# Patient Record
Sex: FEMALE | Race: WHITE | Employment: OTHER | URBAN - METROPOLITAN AREA
[De-identification: names, ages, dates, MRNs, and addresses within clinical notes are randomized per-mention and may not be internally consistent; named-entity substitution may affect disease eponyms.]

---

## 2017-04-28 ENCOUNTER — COMMUNICATION - HEALTHEAST (OUTPATIENT)
Dept: ENDOCRINOLOGY | Facility: CLINIC | Age: 82
End: 2017-04-28

## 2017-04-28 ENCOUNTER — AMBULATORY - HEALTHEAST (OUTPATIENT)
Dept: NURSING | Facility: CLINIC | Age: 82
End: 2017-04-28

## 2017-04-28 DIAGNOSIS — M89.9 DISORDER OF BONE AND CARTILAGE: ICD-10-CM

## 2017-04-28 DIAGNOSIS — M94.9 DISORDER OF BONE AND CARTILAGE: ICD-10-CM

## 2017-05-09 ENCOUNTER — RECORDS - HEALTHEAST (OUTPATIENT)
Dept: BONE DENSITY | Facility: CLINIC | Age: 82
End: 2017-05-09

## 2017-05-09 ENCOUNTER — COMMUNICATION - HEALTHEAST (OUTPATIENT)
Dept: TELEHEALTH | Facility: CLINIC | Age: 82
End: 2017-05-09

## 2017-05-09 DIAGNOSIS — M89.9 DISORDER OF BONE, UNSPECIFIED: ICD-10-CM

## 2017-05-09 DIAGNOSIS — M94.9 DISORDER OF CARTILAGE, UNSPECIFIED: ICD-10-CM

## 2017-05-10 ENCOUNTER — RECORDS - HEALTHEAST (OUTPATIENT)
Dept: ADMINISTRATIVE | Facility: OTHER | Age: 82
End: 2017-05-10

## 2017-05-11 ENCOUNTER — COMMUNICATION - HEALTHEAST (OUTPATIENT)
Dept: ENDOCRINOLOGY | Facility: CLINIC | Age: 82
End: 2017-05-11

## 2017-05-12 ENCOUNTER — AMBULATORY - HEALTHEAST (OUTPATIENT)
Dept: LAB | Facility: CLINIC | Age: 82
End: 2017-05-12

## 2017-05-12 DIAGNOSIS — M89.9 DISORDER OF BONE AND CARTILAGE: ICD-10-CM

## 2017-05-12 DIAGNOSIS — M94.9 DISORDER OF BONE AND CARTILAGE: ICD-10-CM

## 2017-05-19 ENCOUNTER — OFFICE VISIT - HEALTHEAST (OUTPATIENT)
Dept: ENDOCRINOLOGY | Facility: CLINIC | Age: 82
End: 2017-05-19

## 2017-05-19 DIAGNOSIS — M94.9 DISORDER OF BONE AND CARTILAGE: ICD-10-CM

## 2017-05-19 DIAGNOSIS — M89.9 DISORDER OF BONE AND CARTILAGE: ICD-10-CM

## 2017-08-07 ASSESSMENT — MIFFLIN-ST. JEOR: SCORE: 1030.09

## 2017-08-09 ENCOUNTER — ANESTHESIA - HEALTHEAST (OUTPATIENT)
Dept: SURGERY | Facility: CLINIC | Age: 82
End: 2017-08-09

## 2017-08-10 ENCOUNTER — SURGERY - HEALTHEAST (OUTPATIENT)
Dept: SURGERY | Facility: CLINIC | Age: 82
End: 2017-08-10

## 2017-09-01 ENCOUNTER — SURGERY - HEALTHEAST (OUTPATIENT)
Dept: SURGERY | Facility: CLINIC | Age: 82
End: 2017-09-01

## 2017-09-01 ENCOUNTER — ANESTHESIA - HEALTHEAST (OUTPATIENT)
Dept: SURGERY | Facility: CLINIC | Age: 82
End: 2017-09-01

## 2017-11-20 ENCOUNTER — AMBULATORY - HEALTHEAST (OUTPATIENT)
Dept: NURSING | Facility: CLINIC | Age: 82
End: 2017-11-20

## 2018-04-18 ENCOUNTER — RECORDS - HEALTHEAST (OUTPATIENT)
Dept: LAB | Facility: CLINIC | Age: 83
End: 2018-04-18

## 2018-04-18 LAB
ALBUMIN SERPL-MCNC: 3.6 G/DL (ref 3.5–5)
ALP SERPL-CCNC: 48 U/L (ref 45–120)
ALT SERPL W P-5'-P-CCNC: 15 U/L (ref 0–45)
ANION GAP SERPL CALCULATED.3IONS-SCNC: 8 MMOL/L (ref 5–18)
AST SERPL W P-5'-P-CCNC: 19 U/L (ref 0–40)
BILIRUB SERPL-MCNC: 0.6 MG/DL (ref 0–1)
BUN SERPL-MCNC: 30 MG/DL (ref 8–28)
CALCIUM SERPL-MCNC: 9.5 MG/DL (ref 8.5–10.5)
CHLORIDE BLD-SCNC: 105 MMOL/L (ref 98–107)
CHOLEST SERPL-MCNC: 168 MG/DL
CO2 SERPL-SCNC: 27 MMOL/L (ref 22–31)
CREAT SERPL-MCNC: 0.76 MG/DL (ref 0.6–1.1)
FASTING STATUS PATIENT QL REPORTED: NORMAL
GFR SERPL CREATININE-BSD FRML MDRD: >60 ML/MIN/1.73M2
GLUCOSE BLD-MCNC: 86 MG/DL (ref 70–125)
HDLC SERPL-MCNC: 68 MG/DL
LDLC SERPL CALC-MCNC: 81 MG/DL
POTASSIUM BLD-SCNC: 4.8 MMOL/L (ref 3.5–5)
PROT SERPL-MCNC: 6.5 G/DL (ref 6–8)
SODIUM SERPL-SCNC: 140 MMOL/L (ref 136–145)
TRIGL SERPL-MCNC: 96 MG/DL

## 2018-05-02 ENCOUNTER — AMBULATORY - HEALTHEAST (OUTPATIENT)
Dept: ENDOCRINOLOGY | Facility: CLINIC | Age: 83
End: 2018-05-02

## 2018-05-02 ENCOUNTER — COMMUNICATION - HEALTHEAST (OUTPATIENT)
Dept: ENDOCRINOLOGY | Facility: CLINIC | Age: 83
End: 2018-05-02

## 2018-05-02 DIAGNOSIS — M89.9 DISORDER OF BONE AND CARTILAGE: ICD-10-CM

## 2018-05-02 DIAGNOSIS — M81.0 OSTEOPOROSIS: ICD-10-CM

## 2018-05-02 DIAGNOSIS — M94.9 DISORDER OF BONE AND CARTILAGE: ICD-10-CM

## 2019-06-03 ENCOUNTER — RECORDS - HEALTHEAST (OUTPATIENT)
Dept: LAB | Facility: CLINIC | Age: 84
End: 2019-06-03

## 2019-06-03 LAB
ALBUMIN SERPL-MCNC: 3.7 G/DL (ref 3.5–5)
ALP SERPL-CCNC: 47 U/L (ref 45–120)
ALT SERPL W P-5'-P-CCNC: 17 U/L (ref 0–45)
ANION GAP SERPL CALCULATED.3IONS-SCNC: 10 MMOL/L (ref 5–18)
AST SERPL W P-5'-P-CCNC: 14 U/L (ref 0–40)
BILIRUB SERPL-MCNC: 0.4 MG/DL (ref 0–1)
BUN SERPL-MCNC: 32 MG/DL (ref 8–28)
CALCIUM SERPL-MCNC: 10.1 MG/DL (ref 8.5–10.5)
CHLORIDE BLD-SCNC: 103 MMOL/L (ref 98–107)
CHOLEST SERPL-MCNC: 248 MG/DL
CO2 SERPL-SCNC: 27 MMOL/L (ref 22–31)
CREAT SERPL-MCNC: 0.83 MG/DL (ref 0.6–1.1)
FASTING STATUS PATIENT QL REPORTED: ABNORMAL
GFR SERPL CREATININE-BSD FRML MDRD: >60 ML/MIN/1.73M2
GLUCOSE BLD-MCNC: 89 MG/DL (ref 70–125)
HDLC SERPL-MCNC: 64 MG/DL
LDLC SERPL CALC-MCNC: 160 MG/DL
POTASSIUM BLD-SCNC: 4.5 MMOL/L (ref 3.5–5)
PROT SERPL-MCNC: 6.5 G/DL (ref 6–8)
SODIUM SERPL-SCNC: 140 MMOL/L (ref 136–145)
TRIGL SERPL-MCNC: 120 MG/DL
TSH SERPL DL<=0.005 MIU/L-ACNC: 1.9 UIU/ML (ref 0.3–5)

## 2019-06-04 LAB — 25(OH)D3 SERPL-MCNC: 33.1 NG/ML (ref 30–80)

## 2021-01-07 ENCOUNTER — RECORDS - HEALTHEAST (OUTPATIENT)
Dept: LAB | Facility: CLINIC | Age: 86
End: 2021-01-07

## 2021-01-07 LAB
ALBUMIN SERPL-MCNC: 3.8 G/DL (ref 3.5–5)
ALP SERPL-CCNC: 101 U/L (ref 45–120)
ALT SERPL W P-5'-P-CCNC: 15 U/L (ref 0–45)
ANION GAP SERPL CALCULATED.3IONS-SCNC: 9 MMOL/L (ref 5–18)
AST SERPL W P-5'-P-CCNC: 16 U/L (ref 0–40)
BILIRUB SERPL-MCNC: 0.4 MG/DL (ref 0–1)
BUN SERPL-MCNC: 33 MG/DL (ref 8–28)
CALCIUM SERPL-MCNC: 9.5 MG/DL (ref 8.5–10.5)
CHLORIDE BLD-SCNC: 102 MMOL/L (ref 98–107)
CHOLEST SERPL-MCNC: 231 MG/DL
CO2 SERPL-SCNC: 30 MMOL/L (ref 22–31)
CREAT SERPL-MCNC: 0.93 MG/DL (ref 0.6–1.1)
FASTING STATUS PATIENT QL REPORTED: ABNORMAL
GFR SERPL CREATININE-BSD FRML MDRD: 57 ML/MIN/1.73M2
GLUCOSE BLD-MCNC: 106 MG/DL (ref 70–125)
HDLC SERPL-MCNC: 65 MG/DL
LDLC SERPL CALC-MCNC: 137 MG/DL
POTASSIUM BLD-SCNC: 4.5 MMOL/L (ref 3.5–5)
PROT SERPL-MCNC: 6.5 G/DL (ref 6–8)
SODIUM SERPL-SCNC: 141 MMOL/L (ref 136–145)
TRIGL SERPL-MCNC: 145 MG/DL

## 2021-05-13 ENCOUNTER — RECORDS - HEALTHEAST (OUTPATIENT)
Dept: LAB | Facility: CLINIC | Age: 86
End: 2021-05-13

## 2021-05-13 LAB
ANION GAP SERPL CALCULATED.3IONS-SCNC: 9 MMOL/L (ref 5–18)
BUN SERPL-MCNC: 28 MG/DL (ref 8–28)
CALCIUM SERPL-MCNC: 9.2 MG/DL (ref 8.5–10.5)
CHLORIDE BLD-SCNC: 104 MMOL/L (ref 98–107)
CO2 SERPL-SCNC: 28 MMOL/L (ref 22–31)
CREAT SERPL-MCNC: 0.8 MG/DL (ref 0.6–1.1)
GFR SERPL CREATININE-BSD FRML MDRD: >60 ML/MIN/1.73M2
GLUCOSE BLD-MCNC: 104 MG/DL (ref 70–125)
POTASSIUM BLD-SCNC: 4.2 MMOL/L (ref 3.5–5)
SODIUM SERPL-SCNC: 141 MMOL/L (ref 136–145)

## 2021-05-14 LAB — BACTERIA SPEC CULT: NO GROWTH

## 2021-05-28 ENCOUNTER — RECORDS - HEALTHEAST (OUTPATIENT)
Dept: ADMINISTRATIVE | Facility: CLINIC | Age: 86
End: 2021-05-28

## 2021-05-29 ENCOUNTER — RECORDS - HEALTHEAST (OUTPATIENT)
Dept: ADMINISTRATIVE | Facility: CLINIC | Age: 86
End: 2021-05-29

## 2021-05-30 ENCOUNTER — RECORDS - HEALTHEAST (OUTPATIENT)
Dept: ADMINISTRATIVE | Facility: CLINIC | Age: 86
End: 2021-05-30

## 2021-05-31 VITALS — WEIGHT: 135 LBS

## 2021-05-31 VITALS — BODY MASS INDEX: 21.83 KG/M2 | HEIGHT: 65 IN | WEIGHT: 131 LBS

## 2021-05-31 VITALS — WEIGHT: 130.5 LBS | BODY MASS INDEX: 21.72 KG/M2

## 2021-06-10 NOTE — PROGRESS NOTES
Chief Complaint   Patient presents with     Please Disregard      Pt had an appt with Deya PLATT today but she refused to see  she needs Prolia injection only, however, pt has not seen a provider since 2015. Last Dexa scan was 2014. Per protocol unable to give pt shot and pt needs to be seen. Pt would like to get in ASAP, so she'll schedule to see Deya PLATT since Dr. Pires's schedule is totally booked.     No Prolia given today.     Yue Melissa, [facility] 4/28/2017 12:37 PM

## 2021-06-10 NOTE — PROGRESS NOTES
[facility]  ENDOCRINOLOGY    Osteoporosis Follow Up 5/23/2017    Mariela Hagen, 6/7/1933, [id number]          Reason for visit      1. Osteopenia        History     Mariela Hagen is a very pleasant 83 y.o. old female who presents for follow up.   SUMMARY:  The patient had a bone density scan done on 09/11/2013 which showed T-Score of -1.9 on femoral neck on the right and -1.9 on L1 to L4 vertebra. Her FRAX score calculated was 21.3% for major osteoporotic fracture and 5.7% for the hip fracture. Patient reports being treated with Fosamax for at least 5 years. After that, she received 1 Prolia shot last year and a few months later she developed pain in  both TMJs after extensive and long dental work when she had her mouth open for at leat one hour. She was seen at [facility] for this problem and diagnosed with severe destruction of both temporomandibular joints. For this reason, patient stopped Prolia and did not receive any further treatment for osteopenia in the last year.      TODAY:  Mariela is here today in f/u for Osteoporosis.  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  She has been on Prolia injections since 2013.  She has been taking Calcium Carbonate with Vit D twice daily.  She also eats yoghurt, ice cream and drinks skim milk and Comfrey Milk.  She is active and walks daily.  Her most recent Dexa Scan shows that her bone health is stable enough at present to merit a drug Holiday.    Dexa Scan:    1. The spine bone density L1-L4 with T-score -1.1, stable compared to   2015.  2. Femoral bone densities show left femoral neck T- score -1.1 and right   femoral neck T-score -1.2, stable.  3. Trabecular bone score indicates poor trabecular bone architecture.          Risk Factors     The following high- risk conditions have been ruled out: celiac disease, eating disorders, gastric bypass,  hyperparathyroidism, inflammatory bowel disease, hyperthyroidism, rheumatoid arthritis, lupus, chronic kidney disease.    Mariela Hagen has the following risk factors: Age, Female gender, , Low BMI and Family history of osteoporosis    She is not on high risk medications such as glucocorticoids, anti-coagulants, anti-convulsants, chemotherapy or levothyroxine.    Patient deniesHysterectomy, Oophrectomy and Breast cancer.      Past Medical History     Patient Active Problem List   Diagnosis     Hyperlipidemia     TMJ Pain     Osteopenia       Family History       Osteoporosis in her mother. Breast cancer in her aunt, colon cancer in her brother. Negative for celiac disease, kidney stones. She has not used any high-risk  medications.      Social History      reports that she has never smoked. She does not have any smokeless tobacco history on file.      Review of Systems     Patient denies current pain, limited mobility, fractures.   Remainder per HPI.      Vital Signs     /76  Pulse 82  Wt 135 lb (61.2 kg)    Physical Exam     GENERAL:  Normal, NIRD  EYES:  Pupils equal, round and reactive to light; no proptosis, lid lag or  periorbital edema.  THYROID:  Thyroid is normal.  No tenderness or bruit  NECK: No lymph nodes  MUSCULOSKELETAL: No joint abnormalities, FROM in all four extremities. No kyphosis. Muscle strength grossly normal without evidence of wasting.  HEART:  Regular rate and rhythm without murmur.  LUNGS:  Clear to auscultation.  ABDOMEN:Soft, non-tender, no masses or organomegaly  NEURO:  Patella Reflexes were normal.No tremors  SKIN:  No acanthosis nigricans or vitiligo        Assessment     1. Osteopenia        Plan     Pt will be more than happy to go on a Drug Holiday, thank you very much!  Recommendation is to get another Dexa Scan next year, after a change in therapy.  She will continue with her diary intake, supplementation, and walking.  F/u in 1 year.      Total visit  minutes:25  Time spent counseling and coordination of care:23    Jailene Lowe   Endocrinology  5/23/2017  10:20 AM      Current Medications     Outpatient Medications Prior to Visit   Medication Sig Dispense Refill     acetaminophen (TYLENOL) 325 MG tablet        acyclovir (ZOVIRAX) 200 MG capsule        albuterol (PROAIR HFA) 90 mcg/actuation inhaler        almotriptan (AXERT) 12.5 MG tablet        calcium carbonate-vit D3-min (CALTRATE 600+D PLUS MINERALS) 600 mg calcium- 400 unit Tab        conjugated estrogens (PREMARIN) vaginal cream        fluticasone (FLONASE) 50 mcg/actuation nasal spray        glucosamine sulfate 2KCl (GLUCOSAMINE RELIEF) 1,000 mg Tab Take 3 tablets by mouth daily.       mometasone (ELOCON) 0.1 % cream        simvastatin (ZOCOR) 20 MG tablet        Facility-Administered Medications Prior to Visit   Medication Dose Route Frequency Provider Last Rate Last Dose     denosumab 60 mg (PROLIA 60 mg/ml)  60 mg Subcutaneous Q6 Months Rupali Pires MD   60 mg at 05/19/17 1541         Lab Results     TSH   Date Value Ref Range Status   10/16/2013 4.2 0.3 - 5.0 uIU/mL Final     PTH   Date Value Ref Range Status   10/16/2013 42 16 - 73 pg/mL Final     Calcium   Date Value Ref Range Status   05/12/2017 10.2 8.5 - 10.5 mg/dL Final     Phosphorus   Date Value Ref Range Status   10/16/2013 3.2 2.5 - 4.5 mg/dL Final           Imaging Results   Last DEXA scan:  Results for orders placed in visit on 05/09/17   DXA Bone Density Scan    Narrative 5/10/2017      RE: Mariela Hagen  YOB: 1933        Dear Jailene Lowe,    Patient Profile:  83 y.o. female, postmenopausal, is here for the follow up bone density   test.   History of fractures - None. Family history of osteoporosis - Yes;    sibling.  Family history of hip fracture: None. Smoking history - No.   Osteoporosis treatment past -  No. Osteoporosis treatment current - Yes;    Prolia.  Chronic medical problems - Chronic low back  "problems. High risk   medications -  None.      Assessment:    1. The spine bone density L1-L4 with T-score -1.1, stable compared to   2015.  2. Femoral bone densities show left femoral neck T- score -1.1 and right   femoral neck T-score -1.2, stable.  3. Trabecular bone score indicates poor trabecular bone architecture.      83 y.o. female with LOW BONE DENSITY (OSTEOPENIA), stable on the current   treatment.        Recommendations:  Appropriate calcium and vitamin D supplements and possible \"drug holiday\"   recommended with follow up bone density scan in 1 year.      Bone densitometry was performed on your patient using our Aionex   densitometer. The results are summarized and a copy of the actual scans   are included for your review. In conformity with the International Society   of Clinical Densitometry's most recent position statement for DXA   interpretation (2015), the diagnosis will be made on the lowest measured   T-score of the lumbar spine, femoral neck, total proximal femur or 33%   radius. Note the change in terminology for diagnostic classification from   OSTEOPENIA to LOW BONE MASS. All trending for sequential exams will be   done using multiple vertebrae or the total proximal femur. Fracture risk   is based on the WHO Fracture Risk Assessment Tool (FRAX). If additional   information is needed or if you would like to discuss the results, please   do not hesitate to call me.       Thank you for referring this patient to [facility].   We are happy to be of service in support of you and your practice. If you   have any questions or suggestions to improve our service, please call me   at [phone].     Sincerely,     Rupali Pires M.D. KRISCJACKELYN.  Osteoporosis Services, [facility]         "

## 2021-06-12 NOTE — ANESTHESIA CARE TRANSFER NOTE
Last vitals:   Vitals:    09/01/17 1233   BP: 141/67   Pulse: 60   Resp: 16   Temp: 36.8  C (98.3  F)   SpO2: 99%     Patient's level of consciousness is drowsy  Spontaneous respirations: yes  Maintains airway independently: yes  Dentition unchanged: yes  Oropharynx: oropharynx clear of all foreign objects    QCDR Measures:  ASA# 20 - Surgical Safety Checklist: WHO surgical safety checklist completed prior to induction  PQRS# 430 - Adult PONV Prevention: 4558F - Pt received => 2 anti-emetic agents (different classes) preop & intraop  ASA# 8 - Peds PONV Prevention: NA - Not pediatric patient, not GA or 2 or more risk factors NOT present  PQRS# 424 - Brenda-op Temp Management: 4559F - At least one body temp DOCUMENTED => 35.5C or 95.9F within required timeframe  PQRS# 426 - PACU Transfer Protocol: - Transfer of care checklist used  ASA# 14 - Acute Post-op Pain: ASA14B - Patient did NOT experience pain >= 7 out of 10

## 2021-06-12 NOTE — ANESTHESIA PREPROCEDURE EVALUATION
Anesthesia Evaluation      Patient summary reviewed   No history of anesthetic complications     Airway   Mallampati: I  Neck ROM: full   Pulmonary - negative ROS and normal exam                          Cardiovascular - negative ROS and normal exam   Neuro/Psych - negative ROS     Endo/Other - negative ROS      GI/Hepatic/Renal - negative ROS           Dental - normal exam                        Anesthesia Plan  Planned anesthetic: MAC    ASA 1     Anesthetic plan and risks discussed with: patient

## 2021-06-12 NOTE — ANESTHESIA CARE TRANSFER NOTE
Last vitals:   Vitals:    08/10/17 1025   BP: 146/67   Pulse: 60   Resp: 14   Temp: 36.7  C (98.1  F)   SpO2: 99%     Patient's level of consciousness is awake  Spontaneous respirations: yes  Maintains airway independently: yes  Dentition unchanged: yes  Oropharynx: oropharynx clear of all foreign objects    QCDR Measures:  ASA# 20 - Surgical Safety Checklist: WHO surgical safety checklist completed prior to induction  PQRS# 430 - Adult PONV Prevention: 4558F - Pt received => 2 anti-emetic agents (different classes) preop & intraop  ASA# 8 - Peds PONV Prevention: NA - Not pediatric patient, not GA or 2 or more risk factors NOT present  PQRS# 424 - Brenda-op Temp Management: 4559F - At least one body temp DOCUMENTED => 35.5C or 95.9F within required timeframe  PQRS# 426 - PACU Transfer Protocol: - Transfer of care checklist used  ASA# 14 - Acute Post-op Pain: ASA14B - Patient did NOT experience pain >= 7 out of 10   To PACU, VSS, On RA, Report to RN

## 2021-06-12 NOTE — ANESTHESIA POSTPROCEDURE EVALUATION
Patient: Mariela Hagen  #3 INTERSTIM STAGE 2  Anesthesia type: MAC    Patient location: Phase II Recovery  Last vitals:   Vitals:    09/01/17 1320   BP: 177/83   Pulse: 83   Resp: 14   Temp:    SpO2: 98%     Post vital signs: stable  Level of consciousness: awake and responds to simple questions  Post-anesthesia pain: pain controlled  Post-anesthesia nausea and vomiting: no  Pulmonary: unassisted, return to baseline  Cardiovascular: stable and blood pressure at baseline  Hydration: adequate  Anesthetic events: no    QCDR Measures:  ASA# 11 - Brenda-op Cardiac Arrest: ASA11B - Patient did NOT experience unanticipated cardiac arrest  ASA# 12 - Brenda-op Mortality Rate: ASA12B - Patient did NOT die  ASA# 13 - PACU Re-Intubation Rate: NA - No ETT / LMA used for case  ASA# 10 - Composite Anes Safety: ASA10A - No serious adverse event    Additional Notes:

## 2021-06-12 NOTE — ANESTHESIA POSTPROCEDURE EVALUATION
Patient: Mariela Hagen  [facility] STAGE 1  Anesthesia type: MAC    Patient location: Phase II Recovery  Last vitals:   Vitals:    08/10/17 1120   BP: 147/65   Pulse: 69   Resp: 16   Temp: 36.9  C (98.4  F)   SpO2: 97%     Post vital signs: stable  Level of consciousness: awake and responds to simple questions  Post-anesthesia pain: pain controlled  Post-anesthesia nausea and vomiting: no  Pulmonary: unassisted, return to baseline  Cardiovascular: stable and blood pressure at baseline  Hydration: adequate  Anesthetic events: no    QCDR Measures:  ASA# 11 - Brenda-op Cardiac Arrest: ASA11B - Patient did NOT experience unanticipated cardiac arrest  ASA# 12 - Brenda-op Mortality Rate: ASA12B - Patient did NOT die  ASA# 13 - PACU Re-Intubation Rate: NA - No ETT / LMA used for case  ASA# 10 - Composite Anes Safety: ASA10A - No serious adverse event  ASA# 38 - New Corneal Injury: ASA38A - No new exposure keratitis or corneal abrasion in PACU    Additional Notes:

## 2021-06-12 NOTE — ANESTHESIA PREPROCEDURE EVALUATION
Anesthesia Evaluation      Patient summary reviewed   No history of anesthetic complications     Airway   Mallampati: II  Neck ROM: full   Pulmonary - negative ROS and normal exam   (-) not a smoker                         Cardiovascular - negative ROS and normal exam  (+) , hypercholesterolemia,      Neuro/Psych - negative ROS   (+) neuromuscular disease,      Endo/Other - negative ROS      Comments: Eczema; TMJ    GI/Hepatic/Renal - negative ROS     Comments: IBS          Dental - normal exam                        Anesthesia Plan  Planned anesthetic: MAC  Ketamine (0.5 mg/kg). Diprivan infusion.  Toradol, Decadron, Zofran.  ASA 2     Anesthetic plan and risks discussed with: patient  Anesthesia plan special considerations: antiemetics,   Post-op plan: routine recovery

## 2021-06-14 NOTE — PROGRESS NOTES
Chief Complaint   Patient presents with     Prolia Injection     1. Did you experience any problems with previous Prolia injection? NO  2. Do you feel sick today?(fever, RS, GI,  issues)? NO  3. Any medication changes in the last 6 months? NO  4. Did you take prednisone or other immunosuppressant drugs in the last 6 months?(chemo, transplant, rheu, dermatology conditions)? NO  5. Did you have any serious infection in the last 6 months? (pancreatitis) NO  6. Any recent hospitalizations/ surgeries (especially gastric bypass, thyroid, parathyroid)? YES  7. Do you plan any dental work?(especially implants and extractions) in the next 2-3 months? NO  8. Did you have any fractures in the last year? NO    Yearly F/U appointment  with Deya DAVILA is made.   ABN was given/ signed and sent to medical records to rome Melissa CMA [facility] 11/20/2017 11:59 AM

## 2021-06-16 PROBLEM — M81.0 OSTEOPOROSIS: Status: ACTIVE | Noted: 2018-05-02

## 2021-07-13 ENCOUNTER — RECORDS - HEALTHEAST (OUTPATIENT)
Dept: ADMINISTRATIVE | Facility: CLINIC | Age: 86
End: 2021-07-13

## 2021-07-21 ENCOUNTER — RECORDS - HEALTHEAST (OUTPATIENT)
Dept: ADMINISTRATIVE | Facility: CLINIC | Age: 86
End: 2021-07-21

## 2022-03-18 ENCOUNTER — LAB REQUISITION (OUTPATIENT)
Dept: LAB | Facility: CLINIC | Age: 87
End: 2022-03-18

## 2022-03-18 DIAGNOSIS — E78.5 HYPERLIPIDEMIA, UNSPECIFIED: ICD-10-CM

## 2022-03-18 LAB
ALBUMIN SERPL-MCNC: 3.6 G/DL (ref 3.5–5)
ALP SERPL-CCNC: 47 U/L (ref 45–120)
ALT SERPL W P-5'-P-CCNC: 12 U/L (ref 0–45)
ANION GAP SERPL CALCULATED.3IONS-SCNC: 11 MMOL/L (ref 5–18)
AST SERPL W P-5'-P-CCNC: 14 U/L (ref 0–40)
BILIRUB SERPL-MCNC: 0.4 MG/DL (ref 0–1)
BUN SERPL-MCNC: 21 MG/DL (ref 8–28)
CALCIUM SERPL-MCNC: 9.9 MG/DL (ref 8.5–10.5)
CHLORIDE BLD-SCNC: 101 MMOL/L (ref 98–107)
CHOLEST SERPL-MCNC: 214 MG/DL
CO2 SERPL-SCNC: 28 MMOL/L (ref 22–31)
CREAT SERPL-MCNC: 0.87 MG/DL (ref 0.6–1.1)
FASTING STATUS PATIENT QL REPORTED: ABNORMAL
GFR SERPL CREATININE-BSD FRML MDRD: 64 ML/MIN/1.73M2
GLUCOSE BLD-MCNC: 97 MG/DL (ref 70–125)
HDLC SERPL-MCNC: 67 MG/DL
LDLC SERPL CALC-MCNC: 123 MG/DL
POTASSIUM BLD-SCNC: 4.2 MMOL/L (ref 3.5–5)
PROT SERPL-MCNC: 6.4 G/DL (ref 6–8)
SODIUM SERPL-SCNC: 140 MMOL/L (ref 136–145)
TRIGL SERPL-MCNC: 119 MG/DL

## 2022-03-18 PROCEDURE — 80053 COMPREHEN METABOLIC PANEL: CPT | Performed by: PHYSICIAN ASSISTANT

## 2022-03-18 PROCEDURE — 80061 LIPID PANEL: CPT | Performed by: PHYSICIAN ASSISTANT

## 2022-03-29 ENCOUNTER — LAB REQUISITION (OUTPATIENT)
Dept: LAB | Facility: CLINIC | Age: 87
End: 2022-03-29

## 2022-03-29 DIAGNOSIS — R41.3 OTHER AMNESIA: ICD-10-CM

## 2022-03-29 LAB
TSH SERPL DL<=0.005 MIU/L-ACNC: 2.19 UIU/ML (ref 0.3–5)
VIT B12 SERPL-MCNC: 1039 PG/ML (ref 213–816)

## 2022-03-29 PROCEDURE — 82607 VITAMIN B-12: CPT | Performed by: PHYSICIAN ASSISTANT

## 2022-03-29 PROCEDURE — 84443 ASSAY THYROID STIM HORMONE: CPT | Performed by: PHYSICIAN ASSISTANT

## 2022-04-01 ENCOUNTER — ANCILLARY PROCEDURE (OUTPATIENT)
Dept: BONE DENSITY | Facility: CLINIC | Age: 87
End: 2022-04-01
Attending: PHYSICIAN ASSISTANT
Payer: COMMERCIAL

## 2022-04-01 DIAGNOSIS — M81.0 AGE-RELATED OSTEOPOROSIS WITHOUT CURRENT PATHOLOGICAL FRACTURE: ICD-10-CM

## 2022-04-01 PROCEDURE — 77080 DXA BONE DENSITY AXIAL: CPT | Mod: TC | Performed by: RADIOLOGY

## 2022-04-18 ENCOUNTER — OFFICE VISIT (OUTPATIENT)
Dept: GERIATRICS | Facility: CLINIC | Age: 87
End: 2022-04-18
Payer: COMMERCIAL

## 2022-04-18 ENCOUNTER — LAB REQUISITION (OUTPATIENT)
Dept: LAB | Facility: CLINIC | Age: 87
End: 2022-04-18
Payer: COMMERCIAL

## 2022-04-18 VITALS
HEART RATE: 79 BPM | WEIGHT: 110 LBS | TEMPERATURE: 97.4 F | BODY MASS INDEX: 17.68 KG/M2 | OXYGEN SATURATION: 99 % | DIASTOLIC BLOOD PRESSURE: 75 MMHG | RESPIRATION RATE: 20 BRPM | HEIGHT: 66 IN | SYSTOLIC BLOOD PRESSURE: 130 MMHG

## 2022-04-18 DIAGNOSIS — G40.909 SEIZURE DISORDER (H): ICD-10-CM

## 2022-04-18 DIAGNOSIS — E87.1 HYPONATREMIA: ICD-10-CM

## 2022-04-18 DIAGNOSIS — R41.0 CONFUSION: Primary | ICD-10-CM

## 2022-04-18 DIAGNOSIS — U07.1 COVID-19: ICD-10-CM

## 2022-04-18 DIAGNOSIS — G89.29 CHRONIC BILATERAL LOW BACK PAIN WITHOUT SCIATICA: ICD-10-CM

## 2022-04-18 DIAGNOSIS — M94.9 DISORDER OF BONE AND CARTILAGE: ICD-10-CM

## 2022-04-18 DIAGNOSIS — M62.81 GENERALIZED MUSCLE WEAKNESS: ICD-10-CM

## 2022-04-18 DIAGNOSIS — I10 PRIMARY HYPERTENSION: ICD-10-CM

## 2022-04-18 DIAGNOSIS — Z86.73 HISTORY OF STROKE WITHOUT RESIDUAL DEFICITS: ICD-10-CM

## 2022-04-18 DIAGNOSIS — M89.9 DISORDER OF BONE AND CARTILAGE: ICD-10-CM

## 2022-04-18 DIAGNOSIS — M54.50 CHRONIC BILATERAL LOW BACK PAIN WITHOUT SCIATICA: ICD-10-CM

## 2022-04-18 PROBLEM — G47.9 DIFFICULTY SLEEPING: Status: ACTIVE | Noted: 2021-04-03

## 2022-04-18 PROBLEM — R35.1 NOCTURIA: Status: ACTIVE | Noted: 2017-02-14

## 2022-04-18 PROBLEM — R35.0 INCREASED FREQUENCY OF URINATION: Status: ACTIVE | Noted: 2019-08-20

## 2022-04-18 PROBLEM — K58.0 IRRITABLE BOWEL SYNDROME WITH DIARRHEA: Status: ACTIVE | Noted: 2017-01-16

## 2022-04-18 PROBLEM — Z86.69 H/O MIGRAINE: Status: ACTIVE | Noted: 2019-10-03

## 2022-04-18 PROBLEM — R15.9 COMPLETE FECAL INCONTINENCE: Status: ACTIVE | Noted: 2017-02-14

## 2022-04-18 PROBLEM — H53.9 VISUAL CHANGES: Status: ACTIVE | Noted: 2019-10-03

## 2022-04-18 PROBLEM — G43.009 ATYPICAL MIGRAINE: Status: ACTIVE | Noted: 2021-04-03

## 2022-04-18 PROBLEM — R39.198 VOIDING DIFFICULTY: Status: ACTIVE | Noted: 2018-05-30

## 2022-04-18 PROCEDURE — 99305 1ST NF CARE MODERATE MDM 35: CPT | Performed by: FAMILY MEDICINE

## 2022-04-18 PROCEDURE — U0005 INFEC AGEN DETEC AMPLI PROBE: HCPCS | Mod: ORL | Performed by: FAMILY MEDICINE

## 2022-04-18 RX ORDER — ACETAMINOPHEN 325 MG/1
325 TABLET ORAL EVERY 8 HOURS PRN
COMMUNITY

## 2022-04-18 RX ORDER — LATANOPROST 50 UG/ML
1 SOLUTION/ DROPS OPHTHALMIC DAILY
COMMUNITY

## 2022-04-18 RX ORDER — SODIUM PHOSPHATE,MONO-DIBASIC 19G-7G/118
1500 ENEMA (ML) RECTAL DAILY
COMMUNITY

## 2022-04-18 RX ORDER — FERROUS SULFATE 325(65) MG
325 TABLET ORAL
COMMUNITY

## 2022-04-18 RX ORDER — LEVETIRACETAM 500 MG/1
500 TABLET ORAL 2 TIMES DAILY
COMMUNITY

## 2022-04-18 NOTE — LETTER
4/18/2022        RE: Mariela Hagen  [address]  [facility] [place] [address]        [facility]       Patient Mariela Hagen  MRN: [id number]        Reason for Visit     Chief Complaint   Patient presents with     Hospital F/U       Code Status     DNR / DNI    Assessment     Altered mental status  Acute hyponatremia  Global aphasia  Back pain in the setting of recent falls  Pain management  Hypertension  History of prior CVA  Generalized weakness    Plan     Pt is admitted to TCU for strengthening and rehab.  Patient was noted to have moderate to severe cognitive impairment with encephalopathy.  Mental status was fluctuating in the hospital.  Neurology was consulted and suspected she may be postictal  Unfortunately she did not tolerate EEG which did show however mild to moderate slowing.  She has been put on a Keppra trial 500 mg twice daily for seizure prophylaxis  Extensive work-up was done but some of the results are still pending  Not felt to have infectious etiology for her encephalopathy.  Slums score was 7/30 suspicious for vascular dementia  They have recommended outpatient work-up including recheck of cognitive testing  Remains confused with limited recall.  She also had acute onset of hyponatremia which was felt to be hypovolemic.  This was corrected.  She will require a recheck BMP  Also noted to have falls with back pain.  Imaging negative for fractures.  In light of her cognitive impairment narcotics have been avoided.  She is on high doses of Tylenol.  Added ibuprofen as well as topical treatments  Denies any pain.  Other electrolyte treatments were also corrected  HTN STABLE without medications  Recheck labs  Continue with PT/OT    History     Patient is a very pleasant 88 year old female who is admitted to TCU  Patient was admitted to the hospital with acute onset of altered mental status.  She noted to have some increased confusion with somnolence as  well as agitation.  Neurology suspected that she may be postictal  She unfortunately did not tolerate EEG  She was started on Keppra empirically  Additional work-up was all negative  Also noted to have acute hyponatremia which was corrected in the hospital  This was felt to be hypovolemic hyponatremia  She also has a history of recent falls.  Imaging was negative for any fractures  Pain management was optimized.  Also had multiple other electrolyte abnormalities including low potassium and phosphate which was corrected    Past Medical & Surgical History     PAST MEDICAL HISTORY: htn; dementia  PAST SURGICAL HISTORY:   has a past surgical history that includes Pr Inject Nerv Blck,Othr Periph Nerv; REPAIR OF HAMMERTOE,ONE; Bunionectomy; other surgical history; other surgical history (08/2017); and Pr Implant Periph/Gastric Neurostim/ (N/A, 9/1/2017).      Past Social History     Reviewed,  reports that she has never smoked. She has never used smokeless tobacco. She reports current alcohol use. She reports that she does not use drugs.    Family History     Reviewed, and Problem Relation Age of Onset   Rheum Arthritis Birth Mother   Heart Attack Birth Father   Cancer Brother   Stroke Negative Family History       Medication List   Post Discharge Medication Reconciliation Status: Post Discharge Medication Reconciliation Status: discharge medications reconciled and changed, per note/orders.  Current Outpatient Medications   Medication     acetaminophen (TYLENOL) 325 MG tablet     ferrous sulfate (FEROSUL) 325 (65 Fe) MG tablet     glucosamine 500 MG CAPS capsule     latanoprost (XALATAN) 0.005 % ophthalmic solution     levETIRAcetam (KEPPRA) 500 MG tablet     simvastatin (ZOCOR) 20 MG tablet     No current facility-administered medications for this visit.          Allergies     Allergies   Allergen Reactions     Penicillins Other (See Comments)     Went into shock     Caffeine      Ciprofloxacin Other (See  "Comments)     GI Upset, rash     Citric Acid      Erythromycin Diarrhea     \"mycins\"-  She says their were two-  One was erythromycin, the other was aureomycin which is chlortetracycline     Iodine Other (See Comments)     Broke out in little red spots     Latex Itching     Added based on information entered during case entry, please review and add reactions, type, and severity as needed     Sulfa Drugs Swelling and Other (See Comments)     Tetanus Toxoid Swelling     Tetanus-Diphtheria Toxoids Other (See Comments)     Tetracycline Unknown     Aureomycin (Chlortetracycline)     Tomato Rash       Review of Systems   A comprehensive review of 14 systems was done. Pertinent findings noted here and in history of present illness. All the rest negative.  Constitutional: Negative.  Negative for fever, chills, she has  activity change, appetite change and fatigue.   HENT: Negative for congestion and facial swelling.    Eyes: Negative for photophobia, redness and visual disturbance.   Respiratory: Negative for cough and chest tightness.    Cardiovascular: Negative for chest pain, palpitations and leg swelling.   Gastrointestinal: Negative for nausea, diarrhea, constipation, blood in stool and abdominal distention.   Genitourinary: Negative.    Musculoskeletal: Negative.reports no pain at rest    Skin: Negative.    Neurological: Negative for dizziness, tremors, syncope, weakness, light-headedness and headaches.   Hematological: Does not bruise/bleed easily.   Psychiatric/Behavioral: Negative.  Recall is impaired      Physical Exam   /75   Pulse 79   Temp 97.4  F (36.3  C)   Resp 20   Ht 1.676 m (5' 6\")   Wt 49.9 kg (110 lb)   SpO2 99%   BMI 17.75 kg/m       Constitutional: Oriented to person, and appears well-developed.   HEENT:  Normocephalic and atraumatic.  Eyes: Conjunctivae and EOM are normal. Pupils are equal, round, and reactive to light. No discharge.  No scleral icterus. Nose normal. Mouth/Throat: " Oropharynx is clear and moist. No oropharyngeal exudate.    NECK: Normal range of motion. Neck supple. No JVD present. No tracheal deviation present. No thyromegaly present.   CARDIOVASCULAR: Normal rate, regular rhythm and intact distal pulses.  Exam reveals no gallop and no friction rub.  Systolic murmur present.  PULMONARY: Effort normal and breath sounds normal. No respiratory distress.No Wheezing or rales.  ABDOMEN: Soft. Bowel sounds are normal. No distension and no mass.  There is no tenderness. There is no rebound and no guarding. No HSM.  MUSCULOSKELETAL: Normal range of motion. No edema and no tenderness. Mild kyphosis, no tenderness.  LYMPH NODES: Has no cervical, supraclavicular, axillary and groin adenopathy.   NEUROLOGICAL: Alert and oriented to person, . No cranial nerve deficit.  Normal muscle tone. Coordination normal.   GENITOURINARY: Deferred exam.  SKIN: Skin is warm and dry. No rash noted. No erythema. No pallor.   EXTREMITIES: No cyanosis, no clubbing, no edema. No Deformity.  PSYCHIATRIC: Normal mood, affect and behavior.  Recall is impaired    Lab Results     Cbc nl  Bmp-na 134  Troponin nl    Imaging Results     DX Hip/Pelvis/Spine    Result Date: 4/1/2022  EXAM: DX HIP/PELVIS/SPINE LOCATION: [facility] DATE/TIME: 4/1/2022 2:54 PM INDICATION: Age-related osteoporosis without current pathological fracture COMPARISON: 05/09/2017. TECHNIQUE: Dual-energy x-ray absorptiometry performed with routine technique. FINDINGS: Lumbar Spine: L1-L4: BMD: 1.071 g/cm2. T-score: -0.9. Z-score: 1.1. RIGHT Hip Total: BMD: 0.947 g/cm2. T-score: -0.5. Z-score: 2.0 RIGHT Hip Femoral neck: BMD: 0.881 g/cm2. T-score: -1.1. Z-score: 1.4 LEFT Hip Total: BMD: 0.974 g/cm2. T-score: -0.3. Z-score: 2.2 LEFT Hip Femoral neck: BMD: 0.874 g/cm2. T-score: -1.2. Z-score: 1.4 WHO Criteria: Normal: T score at or above -1 SD Osteopenia: T score between -1 and -2.5 SD Osteoporosis: T score at or below -2.5  SD COMPARISON: There has been a 4.7% increase in lumbar spine BMD. There has been a 3.2% increase in bilateral hip BMD. FRAX Results: 10 year probability of major osteoporotic fracture is 9.1%, and of hip fracture is 2.6%, based on the left femoral neck BMD. RECOMMENDATIONS: Consider treatment if major osteoporotic fracture score is greater than or equal to 20%. Consider treatment if hip fracture score is greater than or equal to 3%.     IMPRESSION: Low bone density (OSTEOPENIA). T score meets the World Health Organization (WHO) criteria for low bone density (osteopenia) at one or more measured sites. The risk of osteoporotic fracture increased approximately two-fold for each SD decrease in T-score. JAEL BECKER MD   SYSTEM ID:  [id number]        Electronically signed by  Eusebia Welch MD                             Sincerely,        MEGAN Isaac

## 2022-04-18 NOTE — PROGRESS NOTES
[facility] GERIATRIC SERVICES       Patient Mariela Hagen  MRN: [id number]        Reason for Visit     Chief Complaint   Patient presents with     Hospital F/U       Code Status     DNR / DNI    Assessment     Altered mental status  Acute hyponatremia  Global aphasia  Back pain in the setting of recent falls  Pain management  Hypertension  History of prior CVA  Generalized weakness    Plan     Pt is admitted to TCU for strengthening and rehab.  Patient was noted to have moderate to severe cognitive impairment with encephalopathy.  Mental status was fluctuating in the hospital.  Neurology was consulted and suspected she may be postictal  Unfortunately she did not tolerate EEG which did show however mild to moderate slowing.  She has been put on a Keppra trial 500 mg twice daily for seizure prophylaxis  Extensive work-up was done but some of the results are still pending  Not felt to have infectious etiology for her encephalopathy.  Slums score was 7/30 suspicious for vascular dementia  They have recommended outpatient work-up including recheck of cognitive testing  Remains confused with limited recall.  She also had acute onset of hyponatremia which was felt to be hypovolemic.  This was corrected.  She will require a recheck BMP  Also noted to have falls with back pain.  Imaging negative for fractures.  In light of her cognitive impairment narcotics have been avoided.  She is on high doses of Tylenol.  Added ibuprofen as well as topical treatments  Denies any pain.  Other electrolyte treatments were also corrected  HTN STABLE without medications  Recheck labs  Continue with PT/OT    History     Patient is a very pleasant 88 year old female who is admitted to TCU  Patient was admitted to the hospital with acute onset of altered mental status.  She noted to have some increased confusion with somnolence as well as agitation.  Neurology suspected that she may be postictal  She unfortunately did not tolerate EEG  She  "was started on Keppra empirically  Additional work-up was all negative  Also noted to have acute hyponatremia which was corrected in the hospital  This was felt to be hypovolemic hyponatremia  She also has a history of recent falls.  Imaging was negative for any fractures  Pain management was optimized.  Also had multiple other electrolyte abnormalities including low potassium and phosphate which was corrected    Past Medical & Surgical History     PAST MEDICAL HISTORY: htn; dementia  PAST SURGICAL HISTORY:   has a past surgical history that includes Pr Inject Nerv Blck,Othr Periph Nerv; REPAIR OF HAMMERTOE,ONE; Bunionectomy; other surgical history; other surgical history (08/2017); and Pr Implant Periph/Gastric Neurostim/ (N/A, 9/1/2017).      Past Social History     Reviewed,  reports that she has never smoked. She has never used smokeless tobacco. She reports current alcohol use. She reports that she does not use drugs.    Family History     Reviewed, and Problem Relation Age of Onset   Rheum Arthritis Birth Mother   Heart Attack Birth Father   Cancer Brother   Stroke Negative Family History       Medication List   Post Discharge Medication Reconciliation Status: Post Discharge Medication Reconciliation Status: discharge medications reconciled and changed, per note/orders.  Current Outpatient Medications   Medication     acetaminophen (TYLENOL) 325 MG tablet     ferrous sulfate (FEROSUL) 325 (65 Fe) MG tablet     glucosamine 500 MG CAPS capsule     latanoprost (XALATAN) 0.005 % ophthalmic solution     levETIRAcetam (KEPPRA) 500 MG tablet     simvastatin (ZOCOR) 20 MG tablet     No current facility-administered medications for this visit.          Allergies     Allergies   Allergen Reactions     Penicillins Other (See Comments)     Went into shock     Caffeine      Ciprofloxacin Other (See Comments)     GI Upset, rash     Citric Acid      Erythromycin Diarrhea     \"mycins\"-  She says their were two-  One " "was erythromycin, the other was aureomycin which is chlortetracycline     Iodine Other (See Comments)     Broke out in little red spots     Latex Itching     Added based on information entered during case entry, please review and add reactions, type, and severity as needed     Sulfa Drugs Swelling and Other (See Comments)     Tetanus Toxoid Swelling     Tetanus-Diphtheria Toxoids Other (See Comments)     Tetracycline Unknown     Aureomycin (Chlortetracycline)     Tomato Rash       Review of Systems   A comprehensive review of 14 systems was done. Pertinent findings noted here and in history of present illness. All the rest negative.  Constitutional: Negative.  Negative for fever, chills, she has  activity change, appetite change and fatigue.   HENT: Negative for congestion and facial swelling.    Eyes: Negative for photophobia, redness and visual disturbance.   Respiratory: Negative for cough and chest tightness.    Cardiovascular: Negative for chest pain, palpitations and leg swelling.   Gastrointestinal: Negative for nausea, diarrhea, constipation, blood in stool and abdominal distention.   Genitourinary: Negative.    Musculoskeletal: Negative.reports no pain at rest    Skin: Negative.    Neurological: Negative for dizziness, tremors, syncope, weakness, light-headedness and headaches.   Hematological: Does not bruise/bleed easily.   Psychiatric/Behavioral: Negative.  Recall is impaired      Physical Exam   /75   Pulse 79   Temp 97.4  F (36.3  C)   Resp 20   Ht 1.676 m (5' 6\")   Wt 49.9 kg (110 lb)   SpO2 99%   BMI 17.75 kg/m       Constitutional: Oriented to person, and appears well-developed.   HEENT:  Normocephalic and atraumatic.  Eyes: Conjunctivae and EOM are normal. Pupils are equal, round, and reactive to light. No discharge.  No scleral icterus. Nose normal. Mouth/Throat: Oropharynx is clear and moist. No oropharyngeal exudate.    NECK: Normal range of motion. Neck supple. No JVD present. No " tracheal deviation present. No thyromegaly present.   CARDIOVASCULAR: Normal rate, regular rhythm and intact distal pulses.  Exam reveals no gallop and no friction rub.  Systolic murmur present.  PULMONARY: Effort normal and breath sounds normal. No respiratory distress.No Wheezing or rales.  ABDOMEN: Soft. Bowel sounds are normal. No distension and no mass.  There is no tenderness. There is no rebound and no guarding. No HSM.  MUSCULOSKELETAL: Normal range of motion. No edema and no tenderness. Mild kyphosis, no tenderness.  LYMPH NODES: Has no cervical, supraclavicular, axillary and groin adenopathy.   NEUROLOGICAL: Alert and oriented to person, . No cranial nerve deficit.  Normal muscle tone. Coordination normal.   GENITOURINARY: Deferred exam.  SKIN: Skin is warm and dry. No rash noted. No erythema. No pallor.   EXTREMITIES: No cyanosis, no clubbing, no edema. No Deformity.  PSYCHIATRIC: Normal mood, affect and behavior.  Recall is impaired    Lab Results     Cbc nl  Bmp-na 134  Troponin nl    Imaging Results     DX Hip/Pelvis/Spine    Result Date: 4/1/2022  EXAM: DX HIP/PELVIS/SPINE LOCATION: [facility] DATE/TIME: 4/1/2022 2:54 PM INDICATION: Age-related osteoporosis without current pathological fracture COMPARISON: 05/09/2017. TECHNIQUE: Dual-energy x-ray absorptiometry performed with routine technique. FINDINGS: Lumbar Spine: L1-L4: BMD: 1.071 g/cm2. T-score: -0.9. Z-score: 1.1. RIGHT Hip Total: BMD: 0.947 g/cm2. T-score: -0.5. Z-score: 2.0 RIGHT Hip Femoral neck: BMD: 0.881 g/cm2. T-score: -1.1. Z-score: 1.4 LEFT Hip Total: BMD: 0.974 g/cm2. T-score: -0.3. Z-score: 2.2 LEFT Hip Femoral neck: BMD: 0.874 g/cm2. T-score: -1.2. Z-score: 1.4 WHO Criteria: Normal: T score at or above -1 SD Osteopenia: T score between -1 and -2.5 SD Osteoporosis: T score at or below -2.5 SD COMPARISON: There has been a 4.7% increase in lumbar spine BMD. There has been a 3.2% increase in bilateral hip BMD.  FRAX Results: 10 year probability of major osteoporotic fracture is 9.1%, and of hip fracture is 2.6%, based on the left femoral neck BMD. RECOMMENDATIONS: Consider treatment if major osteoporotic fracture score is greater than or equal to 20%. Consider treatment if hip fracture score is greater than or equal to 3%.     IMPRESSION: Low bone density (OSTEOPENIA). T score meets the World Health Organization (WHO) criteria for low bone density (osteopenia) at one or more measured sites. The risk of osteoporotic fracture increased approximately two-fold for each SD decrease in T-score. JAEL BECKER MD   SYSTEM ID:  [id number]        Electronically signed by  Eusebia Welch MD

## 2022-04-19 ENCOUNTER — LAB REQUISITION (OUTPATIENT)
Dept: LAB | Facility: CLINIC | Age: 87
End: 2022-04-19
Payer: COMMERCIAL

## 2022-04-19 ENCOUNTER — OFFICE VISIT (OUTPATIENT)
Dept: GERIATRICS | Facility: CLINIC | Age: 87
End: 2022-04-19
Payer: COMMERCIAL

## 2022-04-19 VITALS
WEIGHT: 124 LBS | TEMPERATURE: 97.3 F | BODY MASS INDEX: 19.93 KG/M2 | HEIGHT: 66 IN | SYSTOLIC BLOOD PRESSURE: 130 MMHG | DIASTOLIC BLOOD PRESSURE: 75 MMHG | OXYGEN SATURATION: 97 % | RESPIRATION RATE: 20 BRPM | HEART RATE: 79 BPM

## 2022-04-19 DIAGNOSIS — G40.909 SEIZURE DISORDER (H): ICD-10-CM

## 2022-04-19 DIAGNOSIS — U07.1 COVID-19: ICD-10-CM

## 2022-04-19 DIAGNOSIS — G93.40 ENCEPHALOPATHY: Primary | ICD-10-CM

## 2022-04-19 DIAGNOSIS — M62.81 GENERALIZED MUSCLE WEAKNESS: ICD-10-CM

## 2022-04-19 DIAGNOSIS — I10 PRIMARY HYPERTENSION: ICD-10-CM

## 2022-04-19 PROCEDURE — 99309 SBSQ NF CARE MODERATE MDM 30: CPT | Performed by: NURSE PRACTITIONER

## 2022-04-19 NOTE — PROGRESS NOTES
"Code Status:  FULL CODE  Visit Type: RECHECK (Initial)     Facility:   [facility] ([facility]) [[id number]]        History of Present Illness:   Hospital Admission Date: 4/9/2022    Hospital Discharge Date: 4/15/2022      Mariela Hagen is a 88 year old female with a past medical history for HTN, HLD, CVA, atypical migraines, OAB with sacral stimulator, osteoporosis, IBS.  She was recently hospitalized for encephalopathy and weakness.  She was found to have multiple electrolyte imbalances, transminitis. Workup showed SLUMS was 7/30.  Stated that encephalopathy likely related to vascular dementia or alzheimer's dementia and would recommend further workup as an outpatient at the memory clinic.  EEG did show mild-moderate generalized slowing  And so she was started on Keppra BID by neurology.  LP showed elevated P-TAU/ABETA42 ratio whish likely due to neurodegenerative process.     Elevated LFTs likely due to overuse of apap and so this was held and LFTs improved.       Today, patient's biggest complaint is feeling \"woozy\".  She denies any headaches or dizziness.  She is wondering when she follows up with neurology.  She has a memory clinic appointment on 6/14.      History reviewed. No pertinent past medical history.  Past Surgical History:   Procedure Laterality Date     BUNIONECTOMY       HC REPAIR OF HAMMERTOE,ONE      Description: Hammertoe Operation Right Toe No. ___;  Recorded: 10/14/2013;     OTHER SURGICAL HISTORY      bladder lift     OTHER SURGICAL HISTORY  08/2017    OTHER SURGICAL HISTORYinterstim     RI IMPLANT PERIPH/GASTRIC NEUROSTIM/ N/A 9/1/2017    Procedure: INTERSTIM STAGE 2;  Surgeon: Yuval Serrato MD;  Location: [facility];  Service: Urology     RI INJECT NERV BLCK,OTHR PERIPH NERV      Description: Peripheral Nerve Block Wrist Median;  Recorded: 10/14/2013;     History reviewed. No pertinent family history.  Social History     Socioeconomic History     Marital " "status:      Spouse name: Not on file     Number of children: Not on file     Years of education: Not on file     Highest education level: Not on file   Occupational History     Not on file   Tobacco Use     Smoking status: Never Smoker     Smokeless tobacco: Never Used   Substance and Sexual Activity     Alcohol use: Yes     Comment: Alcoholic Drinks/day: rarely     Drug use: No     Sexual activity: Not on file   Other Topics Concern     Not on file   Social History Narrative     Not on file     Social Determinants of Health     Financial Resource Strain: Not on file   Food Insecurity: Not on file   Transportation Needs: Not on file   Physical Activity: Not on file   Stress: Not on file   Social Connections: Not on file   Intimate Partner Violence: Not on file   Housing Stability: Not on file       Current Outpatient Medications   Medication Sig Dispense Refill     acetaminophen (TYLENOL) 325 MG tablet Take 325 mg by mouth every 8 hours as needed for mild pain       ferrous sulfate (FEROSUL) 325 (65 Fe) MG tablet Take 325 mg by mouth daily (with breakfast)       glucosamine 500 MG CAPS capsule Take 1,500 mg by mouth daily       latanoprost (XALATAN) 0.005 % ophthalmic solution Place 1 drop into the right eye daily       levETIRAcetam (KEPPRA) 500 MG tablet Take 500 mg by mouth 2 times daily       simvastatin (ZOCOR) 20 MG tablet [SIMVASTATIN (ZOCOR) 20 MG TABLET] Take 20 mg by mouth every morning.        Allergies   Allergen Reactions     Penicillins Other (See Comments)     Went into shock     Caffeine      Ciprofloxacin Other (See Comments)     GI Upset, rash     Citric Acid      Erythromycin Diarrhea     \"mycins\"-  She says their were two-  One was erythromycin, the other was aureomycin which is chlortetracycline     Iodine Other (See Comments)     Broke out in little red spots     Latex Itching     Added based on information entered during case entry, please review and add reactions, type, and severity " "as needed     Sulfa Drugs Swelling and Other (See Comments)     Tetanus Toxoid Swelling     Tetanus-Diphtheria Toxoids Other (See Comments)     Tetracycline Unknown     Aureomycin (Chlortetracycline)     Tomato Rash     Immunization History   Administered Date(s) Administered     COVID-19,PF,Moderna 02/11/2021, 03/11/2021, 11/10/2021     FLU 6-35 months 11/01/2003, 11/01/2004, 10/01/2005, 09/23/2009, 09/04/2020     FLUAD(HD)65+ QUAD 10/11/2021     Flu 65+ Years 09/19/2018     Influenza (H1N1) 12/11/2009     Influenza (IIV3) PF 01/12/2007, 10/15/2007, 10/31/2008, 11/04/2010, 09/12/2011, 10/03/2012, 09/13/2013     Influenza Vaccine Im 4yrs+ 4 Valent CCIIV4 09/13/2019     Influenza Vaccine, 6+MO IM (QUADRIVALENT W/PRESERVATIVES) 10/07/2014, 10/09/2015, 10/20/2016, 09/19/2017, 10/19/2018     Pneumo Conj 13-V (2010&after) 10/09/2015     Pneumococcal 23 valent 01/25/2005     TD (ADULT, 7+) 01/01/1997     Zoster vaccine recombinant adjuvanted (SHINGRIX) 09/04/2020       Medications list and allergies in the facility chart have been reviewed.  Please see facility EMR for most up to date list.       Review of Systems   Patient denies fever, chills, headache, lightheadedness, dizziness, rhinorrhea, cough, congestion, shortness of breath, chest pain, palpitations, abdominal pain, n/v, diarrhea, constipation, change in appetite, change in sleep pattern, dysuria, frequency, burning or pain with urination.  Other than stated in HPI all other review of systems is negative.         Physical Exam   Vital signs:/75   Pulse 79   Temp 97.3  F (36.3  C)   Resp 20   Ht 1.676 m (5' 6\")   Wt 56.2 kg (124 lb)   SpO2 97%   BMI 20.01 kg/m     GENERAL APPEARANCE: thin, frail elderly woman, in no acute distress.  HEENT: normocephalic, atraumatic  sclerae anicteric, conjunctivae clear and moist, EOM intact  LUNGS: Lung sounds CTA, no adventitious sounds, respiratory effort normal.  CARD: RRR, S1, S2, without murmurs, gallops, rubs, " no JVD, peripheral pulses 2+ and symmetric  BACK: No kyphosis of the thoracic spine.   ABD: Soft, nondistended and nontender with normal bowel sounds.   MSK: Muscle strength and tone were equal bilaterally. Moves all extremities easily and intentionally.   EXTREMITIES: No cyanosis, clubbing or edema.  NEURO: Alert with cognitive impairment,  Face is symmetric.  SKIN: Inspection of the skin reveals no rashes, ulcerations or petechiae.  PSYCH: euthymic      Labs:    (ABNORMAL) Complete Blood Count-No Diff (04/15/2022 8:54 AM T)  Lab Results - (ABNORMAL) Complete Blood Count-No Diff (04/15/2022 8:54 AM T)  Component Value Ref Range Test Method Analysis Time Performed At Pathologist Signature   WBC 10.8 (H) 3.5 - 10.5 x10(9)/L   04/15/2022 9:17 AM [facility]     RBC 4.16 3.90 - 5.03 x10(12)/L   04/15/2022 9:17 AM [facility]     Hemoglobin 12.9 12.0 - 15.5 g/dL   04/15/2022 9:17 AM [facility]     HCT 41.1 34.9 - 44.5 %   04/15/2022 9:17 AM [facility]     MCV 98.8 80.0 - 100.0 fL   04/15/2022 9:17 AM [facility]     MCH 31.0 27.6 - 33.3 pg   04/15/2022 9:17 AM [facility]     MCHC 31.4 (L) 31.5 - 35.2 g/dL   04/15/2022 9:17 AM [facility]     RDW 13.9 11.9 - 15.5 %   04/15/2022 9:17 AM [facility]     Platelets 400 150 - 450 x10(9)/L   04/15/2022 9:17 AM [facility]     Automated NRBC 0 <=0 /100 WBC   04/15/2022 9:17 AM [facility]       Component Value Ref Range Test Method Analysis Time Performed At Pathologist [facility]   Sodium 139 136 - 145 mmol/L   04/15/2022 9:42 AM [facility]     Potassium 3.9 3.5 - 5.1 mmol/L   04/15/2022 9:42 AM [facility]     Chloride 101 98 - 109 mmol/L   04/15/2022 9:42 AM [facility]     CO2 28 20 - 29 mmol/L   04/15/2022 9:42 AM [facility]     Anion Gap 10 7 - 16 mmol/L   04/15/2022 9:42 AM [facility]     Calcium 9.5 8.4 - 10.4 mg/dL   04/15/2022 9:42 AM  [facility]     BUN 17 7 - 26 mg/dL   04/15/2022 9:42 AM [facility]     Creatinine 0.69 0.55 - 1.02 mg/dL   04/15/2022 9:42 AM [facility]     GFR, Estimated >60 >60 mL/min/1.73m2   04/15/2022 9:42 AM [facility]     Glucose 165 (H) 70 - 100 mg/dL                  Assessment/plan:   Encephalopathy  Likely related to underlying dementia. Will need supervision.  Memory clinic in June     Seizure disorder (H)  Continue on Keppra.  Counseled patient that is likely the woozy feeling she is having.  Check keppra level next week.      Generalized muscle weakness  Therapies    Primary hypertension  Controlled, on no medications.     CVA:  No asa, is on a statin.             Electronically signed by: Neeru Riley NP

## 2022-04-19 NOTE — LETTER
"    4/19/2022        RE: Mariela Hagen  [address]  [facility] [place] [address]        Code Status:  FULL CODE  Visit Type: RECHECK (Initial)     Facility:   [facility] ([facility]) [[id number]]        History of Present Illness:   Hospital Admission Date: 4/9/2022    Hospital Discharge Date: 4/15/2022      Mariela Hagen is a 88 year old female with a past medical history for HTN, HLD, CVA, atypical migraines, OAB with sacral stimulator, osteoporosis, IBS.  She was recently hospitalized for encephalopathy and weakness.  She was found to have multiple electrolyte imbalances, transminitis. Workup showed SLUMS was 7/30.  Stated that encephalopathy likely related to vascular dementia or alzheimer's dementia and would recommend further workup as an outpatient at the memory clinic.  EEG did show mild-moderate generalized slowing  And so she was started on Keppra BID by neurology.  LP showed elevated P-TAU/ABETA42 ratio whish likely due to neurodegenerative process.     Elevated LFTs likely due to overuse of apap and so this was held and LFTs improved.       Today, patient's biggest complaint is feeling \"woozy\".  She denies any headaches or dizziness.  She is wondering when she follows up with neurology.  She has a memory clinic appointment on 6/14.      History reviewed. No pertinent past medical history.  Past Surgical History:   Procedure Laterality Date     BUNIONECTOMY       HC REPAIR OF HAMMERTOE,ONE      Description: Hammertoe Operation Right Toe No. ___;  Recorded: 10/14/2013;     OTHER SURGICAL HISTORY      bladder lift     OTHER SURGICAL HISTORY  08/2017    OTHER SURGICAL HISTORYinterstim     MI IMPLANT PERIPH/GASTRIC NEUROSTIM/ N/A 9/1/2017    Procedure: INTERSTIM STAGE 2;  Surgeon: Yuval Serrato MD;  Location: [facility] OR;  Service: Urology     MI INJECT NERV BLCK,OTHR PERIPH NERV      Description: Peripheral Nerve Block Wrist Median;  Recorded: " "10/14/2013;     History reviewed. No pertinent family history.  Social History     Socioeconomic History     Marital status:      Spouse name: Not on file     Number of children: Not on file     Years of education: Not on file     Highest education level: Not on file   Occupational History     Not on file   Tobacco Use     Smoking status: Never Smoker     Smokeless tobacco: Never Used   Substance and Sexual Activity     Alcohol use: Yes     Comment: Alcoholic Drinks/day: rarely     Drug use: No     Sexual activity: Not on file   Other Topics Concern     Not on file   Social History Narrative     Not on file     Social Determinants of Health     Financial Resource Strain: Not on file   Food Insecurity: Not on file   Transportation Needs: Not on file   Physical Activity: Not on file   Stress: Not on file   Social Connections: Not on file   Intimate Partner Violence: Not on file   Housing Stability: Not on file       Current Outpatient Medications   Medication Sig Dispense Refill     acetaminophen (TYLENOL) 325 MG tablet Take 325 mg by mouth every 8 hours as needed for mild pain       ferrous sulfate (FEROSUL) 325 (65 Fe) MG tablet Take 325 mg by mouth daily (with breakfast)       glucosamine 500 MG CAPS capsule Take 1,500 mg by mouth daily       latanoprost (XALATAN) 0.005 % ophthalmic solution Place 1 drop into the right eye daily       levETIRAcetam (KEPPRA) 500 MG tablet Take 500 mg by mouth 2 times daily       simvastatin (ZOCOR) 20 MG tablet [SIMVASTATIN (ZOCOR) 20 MG TABLET] Take 20 mg by mouth every morning.        Allergies   Allergen Reactions     Penicillins Other (See Comments)     Went into shock     Caffeine      Ciprofloxacin Other (See Comments)     GI Upset, rash     Citric Acid      Erythromycin Diarrhea     \"mycins\"-  She says their were two-  One was erythromycin, the other was aureomycin which is chlortetracycline     Iodine Other (See Comments)     Broke out in little red spots     Latex " "Itching     Added based on information entered during case entry, please review and add reactions, type, and severity as needed     Sulfa Drugs Swelling and Other (See Comments)     Tetanus Toxoid Swelling     Tetanus-Diphtheria Toxoids Other (See Comments)     Tetracycline Unknown     Aureomycin (Chlortetracycline)     Tomato Rash     Immunization History   Administered Date(s) Administered     COVID-19,PF,Moderna 02/11/2021, 03/11/2021, 11/10/2021     FLU 6-35 months 11/01/2003, 11/01/2004, 10/01/2005, 09/23/2009, 09/04/2020     FLUAD(HD)65+ QUAD 10/11/2021     Flu 65+ Years 09/19/2018     Influenza (H1N1) 12/11/2009     Influenza (IIV3) PF 01/12/2007, 10/15/2007, 10/31/2008, 11/04/2010, 09/12/2011, 10/03/2012, 09/13/2013     Influenza Vaccine Im 4yrs+ 4 Valent CCIIV4 09/13/2019     Influenza Vaccine, 6+MO IM (QUADRIVALENT W/PRESERVATIVES) 10/07/2014, 10/09/2015, 10/20/2016, 09/19/2017, 10/19/2018     Pneumo Conj 13-V (2010&after) 10/09/2015     Pneumococcal 23 valent 01/25/2005     TD (ADULT, 7+) 01/01/1997     Zoster vaccine recombinant adjuvanted (SHINGRIX) 09/04/2020       Medications list and allergies in the facility chart have been reviewed.  Please see facility EMR for most up to date list.       Review of Systems   Patient denies fever, chills, headache, lightheadedness, dizziness, rhinorrhea, cough, congestion, shortness of breath, chest pain, palpitations, abdominal pain, n/v, diarrhea, constipation, change in appetite, change in sleep pattern, dysuria, frequency, burning or pain with urination.  Other than stated in HPI all other review of systems is negative.         Physical Exam   Vital signs:/75   Pulse 79   Temp 97.3  F (36.3  C)   Resp 20   Ht 1.676 m (5' 6\")   Wt 56.2 kg (124 lb)   SpO2 97%   BMI 20.01 kg/m     GENERAL APPEARANCE: thin, frail elderly woman, in no acute distress.  HEENT: normocephalic, atraumatic  sclerae anicteric, conjunctivae clear and moist, EOM intact  LUNGS: " Lung sounds CTA, no adventitious sounds, respiratory effort normal.  CARD: RRR, S1, S2, without murmurs, gallops, rubs, no JVD, peripheral pulses 2+ and symmetric  BACK: No kyphosis of the thoracic spine.   ABD: Soft, nondistended and nontender with normal bowel sounds.   MSK: Muscle strength and tone were equal bilaterally. Moves all extremities easily and intentionally.   EXTREMITIES: No cyanosis, clubbing or edema.  NEURO: Alert with cognitive impairment,  Face is symmetric.  SKIN: Inspection of the skin reveals no rashes, ulcerations or petechiae.  PSYCH: euthymic      Labs:    (ABNORMAL) Complete Blood Count-No Diff (04/15/2022 8:54 AM T)  Lab Results - (ABNORMAL) Complete Blood Count-No Diff (04/15/2022 8:54 AM T)  Component Value Ref Range Test Method Analysis Time Performed At Pathologist [facility]   WBC 10.8 (H) 3.5 - 10.5 x10(9)/L   04/15/2022 9:17 AM [facility]     RBC 4.16 3.90 - 5.03 x10(12)/L   04/15/2022 9:17 AM [facility]     Hemoglobin 12.9 12.0 - 15.5 g/dL   04/15/2022 9:17 AM [facility]     HCT 41.1 34.9 - 44.5 %   04/15/2022 9:17 AM [facility]     MCV 98.8 80.0 - 100.0 fL   04/15/2022 9:17 AM [facility]     MCH 31.0 27.6 - 33.3 pg   04/15/2022 9:17 AM [facility]     MCHC 31.4 (L) 31.5 - 35.2 g/dL   04/15/2022 9:17 AM [facility]     RDW 13.9 11.9 - 15.5 %   04/15/2022 9:17 AM [facility]     Platelets 400 150 - 450 x10(9)/L   04/15/2022 9:17 AM [facility]     Automated NRBC 0 <=0 /100 WBC   04/15/2022 9:17 AM [facility]       Component Value Ref Range Test Method Analysis Time Performed At Pathologist [facility]   Sodium 139 136 - 145 mmol/L   04/15/2022 9:42 AM [facility]     Potassium 3.9 3.5 - 5.1 mmol/L   04/15/2022 9:42 AM [facility]     Chloride 101 98 - 109 mmol/L   04/15/2022 9:42 AM [facility]     CO2 28 20 - 29 mmol/L   04/15/2022 9:42 AM [facility]      Anion Gap 10 7 - 16 mmol/L   04/15/2022 9:42 AM [facility]     Calcium 9.5 8.4 - 10.4 mg/dL   04/15/2022 9:42 AM [facility]     BUN 17 7 - 26 mg/dL   04/15/2022 9:42 AM [facility]     Creatinine 0.69 0.55 - 1.02 mg/dL   04/15/2022 9:42 AM [facility]     GFR, Estimated >60 >60 mL/min/1.73m2   04/15/2022 9:42 AM [facility]     Glucose 165 (H) 70 - 100 mg/dL                  Assessment/plan:   Encephalopathy  Likely related to underlying dementia. Will need supervision.  Memory clinic in June     Seizure disorder (H)  Continue on Keppra.  Counseled patient that is likely the woozy feeling she is having.  Check keppra level next week.      Generalized muscle weakness  Therapies    Primary hypertension  Controlled, on no medications.     CVA:  No asa, is on a statin.             Electronically signed by: Neeru Riley NP          Sincerely,        eNeru Riley NP

## 2022-04-20 ENCOUNTER — LAB REQUISITION (OUTPATIENT)
Dept: LAB | Facility: CLINIC | Age: 87
End: 2022-04-20
Payer: COMMERCIAL

## 2022-04-20 DIAGNOSIS — U07.1 COVID-19: ICD-10-CM

## 2022-04-20 LAB — SARS-COV-2 RNA RESP QL NAA+PROBE: NEGATIVE

## 2022-04-22 ENCOUNTER — TRANSITIONAL CARE UNIT VISIT (OUTPATIENT)
Dept: GERIATRICS | Facility: CLINIC | Age: 87
End: 2022-04-22
Payer: COMMERCIAL

## 2022-04-22 ENCOUNTER — LAB REQUISITION (OUTPATIENT)
Dept: LAB | Facility: CLINIC | Age: 87
End: 2022-04-22
Payer: COMMERCIAL

## 2022-04-22 VITALS
TEMPERATURE: 97.2 F | HEART RATE: 79 BPM | WEIGHT: 124.8 LBS | RESPIRATION RATE: 20 BRPM | SYSTOLIC BLOOD PRESSURE: 130 MMHG | BODY MASS INDEX: 20.79 KG/M2 | DIASTOLIC BLOOD PRESSURE: 75 MMHG | OXYGEN SATURATION: 97 % | HEIGHT: 65 IN

## 2022-04-22 DIAGNOSIS — I10 PRIMARY HYPERTENSION: ICD-10-CM

## 2022-04-22 DIAGNOSIS — G40.909 SEIZURE DISORDER (H): Primary | ICD-10-CM

## 2022-04-22 DIAGNOSIS — I10 ESSENTIAL (PRIMARY) HYPERTENSION: ICD-10-CM

## 2022-04-22 DIAGNOSIS — M62.81 MUSCLE WEAKNESS: ICD-10-CM

## 2022-04-22 PROCEDURE — 99309 SBSQ NF CARE MODERATE MDM 30: CPT | Performed by: NURSE PRACTITIONER

## 2022-04-22 NOTE — PROGRESS NOTES
"[facility] GERIATRICS    Chief Complaint   Patient presents with     RECHECK     HPI:  Mariela Hagen is a 88 year old  (6/7/1933), who is being seen today for an episodic care visit at: No question data found.. Today's concern is: She has a past medical history of se hypertension, CVA, osteoporosis, IBS and  hyperlipidemia, hospitalized most recently for encephalopathy and weakness.  Slums is 7 out of 30.  She did have a transaminitis and her statin was held but resumed at discharge, multiple lab work-up still pending.  She does have an order to be seen in the memory clinic on 6/24 for possible vascular dementia or Alzheimer's disease.  EEG was performed and Keppra was initiated in the hospital.    Today: Seen sitting in her room; she is frustrated that she keeps receiving tomatoes and other foods that she is allergic to from the facility for meals. This was communicated to nurse manager. She is otherwise denying acute concerns. Feeling well. No known seizures. VS reviewed and stable.    (G40.909) Seizure disorder (H)  (primary encounter diagnosis)  Comment: on keppra; level pending for Monday. No known seizure in facility since readmission.    (I10) Primary hypertension  Comment: Bp <140/90.     (M62.81) Muscle weakness  Comment: Possibly due to transaminitis versus electrolyte imbalance. Working with therapies.        Allergies, and PMH/PSH reviewed in EPIC today.  REVIEW OF SYSTEMS:  4 point ROS including Respiratory, CV, GI and , other than that noted in the HPI,  is negative    Objective:   /75   Pulse 79   Temp 97.2  F (36.2  C)   Resp 20   Ht 1.651 m (5' 5\")   Wt 56.6 kg (124 lb 12.8 oz)   SpO2 97%   BMI 20.77 kg/m    Physical Exam   General appearance: alert, appears stated age and cooperative.   Head: Normocephalic, without obvious abnormality, atraumatic, Eyes: sclera anicteric.  Lungs: respirations without effort, LSCTA; no wheezing or rales.   Cardiovascular: S1, S2. Regular " rate and rhythm.   ABDOMEN: non tender.  Extremities: extremities normal, atraumatic, no cyanosis or edema  Skin: Skin color, texture, turgor normal. No rashes or lesions  Neurologic:oriented. No focal deficits.   Psych: interacts well with caregivers, exhibits logical thought processes and connections, pleasant      Assessment/Plan:  (G40.909) Seizure disorder (H)  (primary encounter diagnosis)  Plan:  -Memory clinic in June.   -Keppra level Monday.     (I10) Primary hypertension  Comment: <140/90.   Plan: Monitor.     (M62.81) Muscle weakness  Comment: work up pending.   Plan:   -PT and OT.     Electronically signed by: Radha Everett CNP

## 2022-04-22 NOTE — LETTER
"    4/22/2022        RE: Mariela Hagen  [address]  [place] [facility] [place] [address]        [facility] GERIATRICS    Chief Complaint   Patient presents with     RECHECK     HPI:  Mariela Hagen is a 88 year old  (6/7/1933), who is being seen today for an episodic care visit at: No question data found.. Today's concern is: She has a past medical history of se hypertension, CVA, osteoporosis, IBS and  hyperlipidemia, hospitalized most recently for encephalopathy and weakness.  Slums is 7 out of 30.  She did have a transaminitis and her statin was held but resumed at discharge, multiple lab work-up still pending.  She does have an order to be seen in the memory clinic on 6/24 for possible vascular dementia or Alzheimer's disease.  EEG was performed and Keppra was initiated in the hospital.    Today: Seen sitting in her room; she is frustrated that she keeps receiving tomatoes and other foods that she is allergic to from the facility for meals. This was communicated to nurse manager. She is otherwise denying acute concerns. Feeling well. No known seizures. VS reviewed and stable.    (G40.909) Seizure disorder (H)  (primary encounter diagnosis)  Comment: on keppra; level pending for Monday. No known seizure in facility since readmission.    (I10) Primary hypertension  Comment: Bp <140/90.     (M62.81) Muscle weakness  Comment: Possibly due to transaminitis versus electrolyte imbalance. Working with therapies.        Allergies, and PMH/PSH reviewed in EPIC today.  REVIEW OF SYSTEMS:  4 point ROS including Respiratory, CV, GI and , other than that noted in the HPI,  is negative    Objective:   /75   Pulse 79   Temp 97.2  F (36.2  C)   Resp 20   Ht 1.651 m (5' 5\")   Wt 56.6 kg (124 lb 12.8 oz)   SpO2 97%   BMI 20.77 kg/m    Physical Exam   General appearance: alert, appears stated age and cooperative.   Head: Normocephalic, without obvious abnormality, atraumatic, Eyes: sclera " anicteric.  Lungs: respirations without effort, LSCTA; no wheezing or rales.   Cardiovascular: S1, S2. Regular rate and rhythm.   ABDOMEN: non tender.  Extremities: extremities normal, atraumatic, no cyanosis or edema  Skin: Skin color, texture, turgor normal. No rashes or lesions  Neurologic:oriented. No focal deficits.   Psych: interacts well with caregivers, exhibits logical thought processes and connections, pleasant      Assessment/Plan:  (G40.909) Seizure disorder (H)  (primary encounter diagnosis)  Plan:  -Memory clinic in June.   -[facility] level Monday.     (I10) Primary hypertension  Comment: <140/90.   Plan: Monitor.     (M62.81) Muscle weakness  Comment: work up pending.   Plan:   -PT and OT.     Electronically signed by: Radha Everett CNP             Sincerely,        Radha Everett, CNP

## 2022-04-25 ENCOUNTER — TELEPHONE (OUTPATIENT)
Dept: GERIATRICS | Facility: CLINIC | Age: 87
End: 2022-04-25

## 2022-04-25 LAB
ANION GAP SERPL CALCULATED.3IONS-SCNC: 10 MMOL/L (ref 5–18)
BUN SERPL-MCNC: 13 MG/DL (ref 8–28)
CALCIUM SERPL-MCNC: 9.8 MG/DL (ref 8.5–10.5)
CHLORIDE BLD-SCNC: 99 MMOL/L (ref 98–107)
CO2 SERPL-SCNC: 29 MMOL/L (ref 22–31)
CREAT SERPL-MCNC: 0.67 MG/DL (ref 0.6–1.1)
GFR SERPL CREATININE-BSD FRML MDRD: 84 ML/MIN/1.73M2
GLUCOSE BLD-MCNC: 96 MG/DL (ref 70–125)
MAGNESIUM SERPL-MCNC: 2.1 MG/DL (ref 1.8–2.6)
POTASSIUM BLD-SCNC: 3.7 MMOL/L (ref 3.5–5)
SODIUM SERPL-SCNC: 138 MMOL/L (ref 136–145)

## 2022-04-25 PROCEDURE — 83735 ASSAY OF MAGNESIUM: CPT | Mod: ORL | Performed by: FAMILY MEDICINE

## 2022-04-25 PROCEDURE — P9604 ONE-WAY ALLOW PRORATED TRIP: HCPCS | Mod: ORL | Performed by: FAMILY MEDICINE

## 2022-04-25 PROCEDURE — 36415 COLL VENOUS BLD VENIPUNCTURE: CPT | Mod: ORL | Performed by: FAMILY MEDICINE

## 2022-04-25 PROCEDURE — 80177 DRUG SCRN QUAN LEVETIRACETAM: CPT | Mod: ORL | Performed by: FAMILY MEDICINE

## 2022-04-25 PROCEDURE — 80048 BASIC METABOLIC PNL TOTAL CA: CPT | Mod: ORL | Performed by: FAMILY MEDICINE

## 2022-04-25 NOTE — TELEPHONE ENCOUNTER
"[facility] Triage Nurse Telephone Encounter    Provider: Eusebia Welch MD  Facility: [facility] Facility Type:  TCU    Caller: Juliann  Call Back Number: [phone]    Allergies:    Allergies   Allergen Reactions     Penicillins Other (See Comments)     Went into shock     Caffeine      Ciprofloxacin Other (See Comments)     GI Upset, rash     Citric Acid      Erythromycin Diarrhea     \"mycins\"-  She says their were two-  One was erythromycin, the other was aureomycin which is chlortetracycline     Iodine Other (See Comments)     Broke out in little red spots     Latex Itching     Added based on information entered during case entry, please review and add reactions, type, and severity as needed     Sulfa Drugs Swelling and Other (See Comments)     Tetanus Toxoid Swelling     Tetanus-Diphtheria Toxoids Other (See Comments)     Tetracycline Unknown     Aureomycin (Chlortetracycline)     Tomato Rash        Reason for call: Nurse is calling to report BMP and Mg levels.  The Keppra level is still in process.      Verbal Order/Direction given by Provider: No new orders re:  BMP and Mg levels.  Flag the Keppra level results to NP tomorrow.      Provider giving Order:  Eusebia Welch MD    Verbal Order given to: Juliann Fishman, [place]      "

## 2022-04-26 LAB — LEVETIRACETAM SERPL-MCNC: 9 UG/ML

## 2022-04-27 VITALS
BODY MASS INDEX: 20.66 KG/M2 | WEIGHT: 124 LBS | SYSTOLIC BLOOD PRESSURE: 130 MMHG | TEMPERATURE: 97.3 F | HEART RATE: 79 BPM | DIASTOLIC BLOOD PRESSURE: 75 MMHG | OXYGEN SATURATION: 99 % | RESPIRATION RATE: 20 BRPM | HEIGHT: 65 IN

## 2022-04-28 ENCOUNTER — TRANSITIONAL CARE UNIT VISIT (OUTPATIENT)
Dept: GERIATRICS | Facility: CLINIC | Age: 87
End: 2022-04-28
Payer: COMMERCIAL

## 2022-04-28 DIAGNOSIS — I10 PRIMARY HYPERTENSION: ICD-10-CM

## 2022-04-28 DIAGNOSIS — M62.81 MUSCLE WEAKNESS: ICD-10-CM

## 2022-04-28 DIAGNOSIS — G40.909 SEIZURE DISORDER (H): Primary | ICD-10-CM

## 2022-04-28 DIAGNOSIS — G31.9 NEURODEGENERATIVE DISORDER (H): ICD-10-CM

## 2022-04-28 PROCEDURE — 99309 SBSQ NF CARE MODERATE MDM 30: CPT | Performed by: NURSE PRACTITIONER

## 2022-04-28 NOTE — LETTER
"    4/27/2022        RE: Mariela Hagen  [address]  [facility] [place] [address]        Code Status:  FULL CODE  Visit Type: RECHECK     Facility:   [facility] ([facility]) [[id number]]        History of Present Illness:   Hospital Admission Date: 4/9/2022    Hospital Discharge Date: 4/15/2022      Mariela Hagen is a 88 year old female with a past medical history for HTN, HLD, CVA, atypical migraines, OAB with sacral stimulator, osteoporosis, IBS.  She was recently hospitalized for encephalopathy and weakness.  She was found to have multiple electrolyte imbalances, transminitis. Workup showed SLUMS was 7/30.  Stated that encephalopathy likely related to vascular dementia or alzheimer's dementia and would recommend further workup as an outpatient at the memory clinic.  EEG did show mild-moderate generalized slowing  And so she was started on Keppra BID by neurology.  LP showed elevated P-TAU/ABETA42 ratio whish likely due to neurodegenerative process.     Elevated LFTs likely due to overuse of apap and so this was held and LFTs improved.       Today, I follow up with patient after feeling \"woozy\" last week.  Today, she denies feeling woozy.  Keppra level sub therapeutic at 9 this week.  SW reports that family is looking for FDC.        Current Outpatient Medications   Medication Sig Dispense Refill     acetaminophen (TYLENOL) 325 MG tablet Take 325 mg by mouth every 8 hours as needed for mild pain       ferrous sulfate (FEROSUL) 325 (65 Fe) MG tablet Take 325 mg by mouth daily (with breakfast)       glucosamine 500 MG CAPS capsule Take 1,500 mg by mouth daily       latanoprost (XALATAN) 0.005 % ophthalmic solution Place 1 drop into the right eye daily       levETIRAcetam (KEPPRA) 500 MG tablet Take 500 mg by mouth 2 times daily       simvastatin (ZOCOR) 20 MG tablet [SIMVASTATIN (ZOCOR) 20 MG TABLET] Take 20 mg by mouth every morning.        Allergies   Allergen Reactions     " "Penicillins Other (See Comments)     Went into shock     Caffeine      Ciprofloxacin Other (See Comments)     GI Upset, rash     Citric Acid      Erythromycin Diarrhea     \"mycins\"-  She says their were two-  One was erythromycin, the other was aureomycin which is chlortetracycline     Iodine Other (See Comments)     Broke out in little red spots     Latex Itching     Added based on information entered during case entry, please review and add reactions, type, and severity as needed     Sulfa Drugs Swelling and Other (See Comments)     Tetanus Toxoid Swelling     Tetanus-Diphtheria Toxoids Other (See Comments)     Tetracycline Unknown     Aureomycin (Chlortetracycline)     Tomato Rash     Immunization History   Administered Date(s) Administered     COVID-19,PF,Moderna 02/11/2021, 03/11/2021, 11/10/2021     FLU 6-35 months 11/01/2003, 11/01/2004, 10/01/2005, 09/23/2009, 09/04/2020     FLUAD(HD)65+ QUAD 10/11/2021     Flu 65+ Years 09/19/2018     Flu, Unspecified 11/01/2003, 11/01/2004, 10/01/2005, 01/12/2007, 10/15/2007, 10/31/2008, 09/23/2009, 11/04/2010, 09/12/2011, 10/03/2012     Influenza (H1N1) 12/11/2009     Influenza (IIV3) PF 01/12/2007, 10/15/2007, 10/31/2008, 11/04/2010, 09/12/2011, 10/03/2012, 09/13/2013, 10/07/2014     Influenza Vaccine Im 4yrs+ 4 Valent CCIIV4 09/13/2019     Influenza Vaccine, 6+MO IM (QUADRIVALENT W/PRESERVATIVES) 10/07/2014, 10/09/2015, 10/20/2016, 09/19/2017, 10/19/2018     Pneumo Conj 13-V (2010&after) 10/09/2015     Pneumococcal 23 valent 01/25/2005     TD (ADULT, 7+) 01/01/1997     Td (Adult), Adsorbed 01/01/1997     Zoster vaccine recombinant adjuvanted (SHINGRIX) 09/04/2020       Medications list and allergies in the facility chart have been reviewed.  Please see facility EMR for most up to date list.       Review of Systems   Patient denies fever, chills, headache, lightheadedness, dizziness, rhinorrhea, cough, congestion, shortness of breath, chest pain, palpitations, abdominal " "pain, n/v, diarrhea, constipation, change in appetite, change in sleep pattern, dysuria, frequency, burning or pain with urination.  Other than stated in HPI all other review of systems is negative.         Physical Exam   Vital signs:/75   Pulse 79   Temp 97.3  F (36.3  C)   Resp 20   Ht 1.651 m (5' 5\")   Wt 56.2 kg (124 lb)   SpO2 99%   BMI 20.63 kg/m     GENERAL APPEARANCE: thin, frail elderly woman, in no acute distress.  HEENT: normocephalic, atraumatic  sclerae anicteric, conjunctivae clear and moist, EOM intact  LUNGS: Lung sounds CTA, no adventitious sounds, respiratory effort normal.  CARD: RRR, S1, S2, without murmurs, gallops, rubs  ABD: Soft, nondistended and nontender with normal bowel sounds.   MSK: Muscle strength and tone were equal bilaterally. Moves all extremities easily and intentionally.   EXTREMITIES: No cyanosis, clubbing or edema.  NEURO: Alert with cognitive impairment,  Face is symmetric.  SKIN: Inspection of the skin reveals no rashes, ulcerations or petechiae.  PSYCH: euthymic        Labs:   Last Comprehensive Metabolic Panel:  Sodium   Date Value Ref Range Status   04/25/2022 138 136 - 145 mmol/L Final     Potassium   Date Value Ref Range Status   04/25/2022 3.7 3.5 - 5.0 mmol/L Final     Chloride   Date Value Ref Range Status   04/25/2022 99 98 - 107 mmol/L Final     Carbon Dioxide (CO2)   Date Value Ref Range Status   04/25/2022 29 22 - 31 mmol/L Final     Anion Gap   Date Value Ref Range Status   04/25/2022 10 5 - 18 mmol/L Final     Glucose   Date Value Ref Range Status   04/25/2022 96 70 - 125 mg/dL Final     Urea Nitrogen   Date Value Ref Range Status   04/25/2022 13 8 - 28 mg/dL Final     Creatinine   Date Value Ref Range Status   04/25/2022 0.67 0.60 - 1.10 mg/dL Final     GFR Estimate   Date Value Ref Range Status   04/25/2022 84 >60 mL/min/1.73m2 Final     Comment:     Effective December 21, 2021 eGFRcr in adults is calculated using the 2021 CKD-EPI creatinine " equation which includes age and gender (Slick tejeda al., NEJ, DOI: 10.1056/ZODVtn7290221)   05/13/2021 >60 >60 mL/min/1.73m2 Final     Calcium   Date Value Ref Range Status   04/25/2022 9.8 8.5 - 10.5 mg/dL Final           Assessment/plan:   Seizure disorder (H)  keppra level 9, I do not want to increase Keppra due to her previously feeling woozy.  Will defer to neurology.  Monitor for seizure    Neurodegenerative disorder (H)  Follow up with cognitive clinic in June.  Will need FPC at discharge     Primary hypertension  Diet controlled only.     Muscle weakness  Continue with therapies, strength is improving.       CVA:  No asa, is on a statin.             Electronically signed by: Neeru Riley NP          Sincerely,        Neeru Riley NP

## 2022-04-28 NOTE — PROGRESS NOTES
"Code Status:  FULL CODE  Visit Type: RECHECK     Facility:   [facility] ([facility]) [[id number]]        History of Present Illness:   Hospital Admission Date: 4/9/2022    Hospital Discharge Date: 4/15/2022      Mariela Hagen is a 88 year old female with a past medical history for HTN, HLD, CVA, atypical migraines, OAB with sacral stimulator, osteoporosis, IBS.  She was recently hospitalized for encephalopathy and weakness.  She was found to have multiple electrolyte imbalances, transminitis. Workup showed SLUMS was 7/30.  Stated that encephalopathy likely related to vascular dementia or alzheimer's dementia and would recommend further workup as an outpatient at the memory clinic.  EEG did show mild-moderate generalized slowing  And so she was started on Keppra BID by neurology.  LP showed elevated P-TAU/ABETA42 ratio whish likely due to neurodegenerative process.     Elevated LFTs likely due to overuse of apap and so this was held and LFTs improved.       Today, I follow up with patient after feeling \"woozy\" last week.  Today, she denies feeling woozy.  Keppra level sub therapeutic at 9 this week.  SW reports that family is looking for CHEN.        Current Outpatient Medications   Medication Sig Dispense Refill     acetaminophen (TYLENOL) 325 MG tablet Take 325 mg by mouth every 8 hours as needed for mild pain       ferrous sulfate (FEROSUL) 325 (65 Fe) MG tablet Take 325 mg by mouth daily (with breakfast)       glucosamine 500 MG CAPS capsule Take 1,500 mg by mouth daily       latanoprost (XALATAN) 0.005 % ophthalmic solution Place 1 drop into the right eye daily       levETIRAcetam (KEPPRA) 500 MG tablet Take 500 mg by mouth 2 times daily       simvastatin (ZOCOR) 20 MG tablet [SIMVASTATIN (ZOCOR) 20 MG TABLET] Take 20 mg by mouth every morning.        Allergies   Allergen Reactions     Penicillins Other (See Comments)     Went into shock     Caffeine      Ciprofloxacin Other (See Comments)     GI Upset, " "rash     Citric Acid      Erythromycin Diarrhea     \"mycins\"-  She says their were two-  One was erythromycin, the other was aureomycin which is chlortetracycline     Iodine Other (See Comments)     Broke out in little red spots     Latex Itching     Added based on information entered during case entry, please review and add reactions, type, and severity as needed     Sulfa Drugs Swelling and Other (See Comments)     Tetanus Toxoid Swelling     Tetanus-Diphtheria Toxoids Other (See Comments)     Tetracycline Unknown     Aureomycin (Chlortetracycline)     Tomato Rash     Immunization History   Administered Date(s) Administered     COVID-19,PF,Moderna 02/11/2021, 03/11/2021, 11/10/2021     FLU 6-35 months 11/01/2003, 11/01/2004, 10/01/2005, 09/23/2009, 09/04/2020     FLUAD(HD)65+ QUAD 10/11/2021     Flu 65+ Years 09/19/2018     Flu, Unspecified 11/01/2003, 11/01/2004, 10/01/2005, 01/12/2007, 10/15/2007, 10/31/2008, 09/23/2009, 11/04/2010, 09/12/2011, 10/03/2012     Influenza (H1N1) 12/11/2009     Influenza (IIV3) PF 01/12/2007, 10/15/2007, 10/31/2008, 11/04/2010, 09/12/2011, 10/03/2012, 09/13/2013, 10/07/2014     Influenza Vaccine Im 4yrs+ 4 Valent CCIIV4 09/13/2019     Influenza Vaccine, 6+MO IM (QUADRIVALENT W/PRESERVATIVES) 10/07/2014, 10/09/2015, 10/20/2016, 09/19/2017, 10/19/2018     Pneumo Conj 13-V (2010&after) 10/09/2015     Pneumococcal 23 valent 01/25/2005     TD (ADULT, 7+) 01/01/1997     Td (Adult), Adsorbed 01/01/1997     Zoster vaccine recombinant adjuvanted (SHINGRIX) 09/04/2020       Medications list and allergies in the facility chart have been reviewed.  Please see facility EMR for most up to date list.       Review of Systems   Patient denies fever, chills, headache, lightheadedness, dizziness, rhinorrhea, cough, congestion, shortness of breath, chest pain, palpitations, abdominal pain, n/v, diarrhea, constipation, change in appetite, change in sleep pattern, dysuria, frequency, burning or pain with " "urination.  Other than stated in HPI all other review of systems is negative.         Physical Exam   Vital signs:/75   Pulse 79   Temp 97.3  F (36.3  C)   Resp 20   Ht 1.651 m (5' 5\")   Wt 56.2 kg (124 lb)   SpO2 99%   BMI 20.63 kg/m     GENERAL APPEARANCE: thin, frail elderly woman, in no acute distress.  HEENT: normocephalic, atraumatic  sclerae anicteric, conjunctivae clear and moist, EOM intact  LUNGS: Lung sounds CTA, no adventitious sounds, respiratory effort normal.  CARD: RRR, S1, S2, without murmurs, gallops, rubs  ABD: Soft, nondistended and nontender with normal bowel sounds.   MSK: Muscle strength and tone were equal bilaterally. Moves all extremities easily and intentionally.   EXTREMITIES: No cyanosis, clubbing or edema.  NEURO: Alert with cognitive impairment,  Face is symmetric.  SKIN: Inspection of the skin reveals no rashes, ulcerations or petechiae.  PSYCH: euthymic        Labs:   Last Comprehensive Metabolic Panel:  Sodium   Date Value Ref Range Status   04/25/2022 138 136 - 145 mmol/L Final     Potassium   Date Value Ref Range Status   04/25/2022 3.7 3.5 - 5.0 mmol/L Final     Chloride   Date Value Ref Range Status   04/25/2022 99 98 - 107 mmol/L Final     Carbon Dioxide (CO2)   Date Value Ref Range Status   04/25/2022 29 22 - 31 mmol/L Final     Anion Gap   Date Value Ref Range Status   04/25/2022 10 5 - 18 mmol/L Final     Glucose   Date Value Ref Range Status   04/25/2022 96 70 - 125 mg/dL Final     Urea Nitrogen   Date Value Ref Range Status   04/25/2022 13 8 - 28 mg/dL Final     Creatinine   Date Value Ref Range Status   04/25/2022 0.67 0.60 - 1.10 mg/dL Final     GFR Estimate   Date Value Ref Range Status   04/25/2022 84 >60 mL/min/1.73m2 Final     Comment:     Effective December 21, 2021 eGFRcr in adults is calculated using the 2021 CKD-EPI creatinine equation which includes age and gender (Slick tejeda al., NEJ, DOI: 10.1056/LCZPey5513104)   05/13/2021 >60 >60 mL/min/1.73m2 " Final     Calcium   Date Value Ref Range Status   04/25/2022 9.8 8.5 - 10.5 mg/dL Final           Assessment/plan:   Seizure disorder (H)  keppra level 9, I do not want to increase Keppra due to her previously feeling woozy.  Will defer to neurology.  Monitor for seizure    Neurodegenerative disorder (H)  Follow up with cognitive clinic in June.  Will need CHEN at discharge     Primary hypertension  Diet controlled only.     Muscle weakness  Continue with therapies, strength is improving.       CVA:  No asa, is on a statin.             Electronically signed by: Neeru Riley NP

## 2022-05-02 ENCOUNTER — TRANSITIONAL CARE UNIT VISIT (OUTPATIENT)
Dept: GERIATRICS | Facility: CLINIC | Age: 87
End: 2022-05-02
Payer: COMMERCIAL

## 2022-05-02 VITALS
OXYGEN SATURATION: 97 % | TEMPERATURE: 96.9 F | HEIGHT: 65 IN | BODY MASS INDEX: 20.66 KG/M2 | HEART RATE: 79 BPM | SYSTOLIC BLOOD PRESSURE: 130 MMHG | RESPIRATION RATE: 20 BRPM | WEIGHT: 124 LBS | DIASTOLIC BLOOD PRESSURE: 75 MMHG

## 2022-05-02 DIAGNOSIS — G31.9 NEURODEGENERATIVE DISORDER (H): ICD-10-CM

## 2022-05-02 DIAGNOSIS — M62.81 MUSCLE WEAKNESS: ICD-10-CM

## 2022-05-02 DIAGNOSIS — G40.909 SEIZURE DISORDER (H): Primary | ICD-10-CM

## 2022-05-02 DIAGNOSIS — I10 PRIMARY HYPERTENSION: ICD-10-CM

## 2022-05-02 PROCEDURE — 99309 SBSQ NF CARE MODERATE MDM 30: CPT | Performed by: FAMILY MEDICINE

## 2022-05-02 NOTE — LETTER
5/2/2022        RE: Mariela Hagen  [address]  [facility] [place] [id number]        [facility]       Patient Mariela Hagen  MRN: [id number]        Reason for Visit     Chief Complaint   Patient presents with     RECHECK   follow-up cognition    Code Status     DNR / DNI    Assessment     Altered mental status  Acute hyponatremia  Global aphasia  Back pain in the setting of recent falls  Pain management  Hypertension  History of prior CVA  Generalized weakness    Plan     Pt is admitted to TCU for strengthening and rehab.  Patient was noted to have moderate to severe cognitive impairment with encephalopathy.  Mental status was fluctuating in the hospital.  Neurology was consulted and suspected she may be postictal  She is tolerating her Keppra dose well.  Recheck Keppra level is 9.  Continue to monitor follow-up with neurology.  At baseline has significant cognitively impairment noted and at best alert and oriented to self.  She remains a poor historian blood pressures are stable.  Overall remains bedbound  Recheck labs done in the TCU show stable BMP and electrolytes  BIMS 13/15  Reweigh requested due to weight gain of almost 15 pounds and more      History     Patient is a very pleasant 88 year old female who is admitted to TCU  Patient was admitted to the hospital with acute onset of altered mental status.  She noted to have some increased confusion with somnolence as well as agitation.  Neurology suspected that she may be postictal  She unfortunately did not tolerate EEG  She was started on Keppra empirically  Additional work-up was all negative  Recheck Keppra levels are low at 9.  No breakthrough seizures have been reported below.    Also noted to have acute hyponatremia which was corrected in the hospital  This was felt to be hypovolemic hyponatremia  Recheck BMP shows stable electrolytes  She also has a history of recent falls.  Imaging was negative for any  "fractures  Pain management was optimized.  Also had multiple other electrolyte abnormalities including low potassium and phosphate which was corrected  At baseline she is quite pleasantly confused    Past Medical & Surgical History     PAST MEDICAL HISTORY: htn; dementia  PAST SURGICAL HISTORY:   has a past surgical history that includes Pr Inject Nerv Blck,Othr Periph Nerv; REPAIR OF HAMMERTOE,ONE; Bunionectomy; other surgical history; other surgical history (08/2017); and Pr Implant Periph/Gastric Neurostim/ (N/A, 9/1/2017).      Past Social History     Reviewed,  reports that she has never smoked. She has never used smokeless tobacco. She reports current alcohol use. She reports that she does not use drugs.    Family History     Reviewed, and Problem Relation Age of Onset   Rheum Arthritis Birth Mother   Heart Attack Birth Father   Cancer Brother   Stroke Negative Family History       Medication List   Post Discharge Medication Reconciliation Status: Post Discharge Medication Reconciliation Status: discharge medications reconciled and changed, per note/orders.  Current Outpatient Medications   Medication     acetaminophen (TYLENOL) 325 MG tablet     ferrous sulfate (FEROSUL) 325 (65 Fe) MG tablet     glucosamine 500 MG CAPS capsule     latanoprost (XALATAN) 0.005 % ophthalmic solution     levETIRAcetam (KEPPRA) 500 MG tablet     simvastatin (ZOCOR) 20 MG tablet     No current facility-administered medications for this visit.          Allergies     Allergies   Allergen Reactions     Penicillins Other (See Comments)     Went into shock     Caffeine      Ciprofloxacin Other (See Comments)     GI Upset, rash     Citric Acid      Erythromycin Diarrhea     \"mycins\"-  She says their were two-  One was erythromycin, the other was aureomycin which is chlortetracycline     Iodine Other (See Comments)     Broke out in little red spots     Latex Itching     Added based on information entered during case entry, please " "review and add reactions, type, and severity as needed     Sulfa Drugs Swelling and Other (See Comments)     Tetanus Toxoid Swelling     Tetanus-Diphtheria Toxoids Other (See Comments)     Tetracycline Unknown     Aureomycin (Chlortetracycline)     Tomato Rash       Review of Systems   A comprehensive review of 14 systems was done. Pertinent findings noted here and in history of present illness. All the rest negative.  Constitutional: Negative.  Negative for fever, chills, she has  activity change, appetite change and fatigue.   HENT: Negative for congestion and facial swelling.    Eyes: Negative for photophobia, redness and visual disturbance.   Respiratory: Negative for cough and chest tightness.    Cardiovascular: Negative for chest pain, palpitations and leg swelling.   Gastrointestinal: Negative for nausea, diarrhea, constipation, blood in stool and abdominal distention.   Genitourinary: Negative.    Musculoskeletal: Negative.reports no pain at rest    Skin: Negative.    Neurological: Negative for dizziness, tremors, syncope, weakness, light-headedness and headaches.   Hematological: Does not bruise/bleed easily.   Psychiatric/Behavioral: Negative.  Recall is impaired      Physical Exam   /75   Pulse 79   Temp 96.9  F (36.1  C)   Resp 20   Ht 1.651 m (5' 5\")   Wt 56.2 kg (124 lb)   SpO2 97%   BMI 20.63 kg/m       Constitutional: Oriented to person, and appears well-developed.   HEENT:  Normocephalic and atraumatic.  Eyes: Conjunctivae and EOM are normal. Pupils are equal, round, and reactive to light. No discharge.  No scleral icterus. Nose normal. Mouth/Throat: Oropharynx is clear and moist. No oropharyngeal exudate.    NECK: Normal range of motion. Neck supple. No JVD present. No tracheal deviation present. No thyromegaly present.   CARDIOVASCULAR: Normal rate, regular rhythm and intact distal pulses.  Exam reveals no gallop and no friction rub.  Systolic murmur present.  PULMONARY: Effort normal " and breath sounds normal. No respiratory distress.No Wheezing or rales.  ABDOMEN: Soft. Bowel sounds are normal. No distension and no mass.  There is no tenderness. There is no rebound and no guarding. No HSM.  MUSCULOSKELETAL: Normal range of motion. No edema and no tenderness. Mild kyphosis, no tenderness.  LYMPH NODES: Has no cervical, supraclavicular, axillary and groin adenopathy.   NEUROLOGICAL: Alert and oriented to person, . No cranial nerve deficit.  Normal muscle tone. Coordination normal.   GENITOURINARY: Deferred exam.  SKIN: Skin is warm and dry. No rash noted. No erythema. No pallor.   EXTREMITIES: No cyanosis, no clubbing, no edema. No Deformity.  PSYCHIATRIC: Normal mood, affect and behavior.  Recall is impaired    Lab Results     Cbc nl  Bmp-na 134  Troponin nl  Check BMP was normal Keppra level was 9    Electronically signed by  Eusebia Welch MD                             Sincerely,        MEGAN Isaac

## 2022-05-02 NOTE — PROGRESS NOTES
[facility] GERIATRIC SERVICES       Patient Mariela Hagen  MRN: [id number]        Reason for Visit     Chief Complaint   Patient presents with     RECHECK   follow-up cognition    Code Status     DNR / DNI    Assessment     Altered mental status  Acute hyponatremia  Global aphasia  Back pain in the setting of recent falls  Pain management  Hypertension  History of prior CVA  Generalized weakness    Plan     Pt is admitted to TCU for strengthening and rehab.  Patient was noted to have moderate to severe cognitive impairment with encephalopathy.  Mental status was fluctuating in the hospital.  Neurology was consulted and suspected she may be postictal  She is tolerating her Keppra dose well.  Recheck Keppra level is 9.  Continue to monitor follow-up with neurology.  At baseline has significant cognitively impairment noted and at best alert and oriented to self.  She remains a poor historian blood pressures are stable.  Overall remains bedbound  Recheck labs done in the TCU show stable BMP and electrolytes  BIMS 13/15  Reweigh requested due to weight gain of almost 15 pounds and more      History     Patient is a very pleasant 88 year old female who is admitted to TCU  Patient was admitted to the hospital with acute onset of altered mental status.  She noted to have some increased confusion with somnolence as well as agitation.  Neurology suspected that she may be postictal  She unfortunately did not tolerate EEG  She was started on Keppra empirically  Additional work-up was all negative  Recheck Keppra levels are low at 9.  No breakthrough seizures have been reported below.    Also noted to have acute hyponatremia which was corrected in the hospital  This was felt to be hypovolemic hyponatremia  Recheck BMP shows stable electrolytes  She also has a history of recent falls.  Imaging was negative for any fractures  Pain management was optimized.  Also had multiple other electrolyte abnormalities including low  "potassium and phosphate which was corrected  At baseline she is quite pleasantly confused    Past Medical & Surgical History     PAST MEDICAL HISTORY: htn; dementia  PAST SURGICAL HISTORY:   has a past surgical history that includes Pr Inject Nerv Blck,Othr Periph Nerv; REPAIR OF HAMMERTOE,ONE; Bunionectomy; other surgical history; other surgical history (08/2017); and Pr Implant Periph/Gastric Neurostim/ (N/A, 9/1/2017).      Past Social History     Reviewed,  reports that she has never smoked. She has never used smokeless tobacco. She reports current alcohol use. She reports that she does not use drugs.    Family History     Reviewed, and Problem Relation Age of Onset   Rheum Arthritis Birth Mother   Heart Attack Birth Father   Cancer Brother   Stroke Negative Family History       Medication List   Post Discharge Medication Reconciliation Status: Post Discharge Medication Reconciliation Status: discharge medications reconciled and changed, per note/orders.  Current Outpatient Medications   Medication     acetaminophen (TYLENOL) 325 MG tablet     ferrous sulfate (FEROSUL) 325 (65 Fe) MG tablet     glucosamine 500 MG CAPS capsule     latanoprost (XALATAN) 0.005 % ophthalmic solution     levETIRAcetam (KEPPRA) 500 MG tablet     simvastatin (ZOCOR) 20 MG tablet     No current facility-administered medications for this visit.          Allergies     Allergies   Allergen Reactions     Penicillins Other (See Comments)     Went into shock     Caffeine      Ciprofloxacin Other (See Comments)     GI Upset, rash     Citric Acid      Erythromycin Diarrhea     \"mycins\"-  She says their were two-  One was erythromycin, the other was aureomycin which is chlortetracycline     Iodine Other (See Comments)     Broke out in little red spots     Latex Itching     Added based on information entered during case entry, please review and add reactions, type, and severity as needed     Sulfa Drugs Swelling and Other (See Comments) " "    Tetanus Toxoid Swelling     Tetanus-Diphtheria Toxoids Other (See Comments)     Tetracycline Unknown     Aureomycin (Chlortetracycline)     Tomato Rash       Review of Systems   A comprehensive review of 14 systems was done. Pertinent findings noted here and in history of present illness. All the rest negative.  Constitutional: Negative.  Negative for fever, chills, she has  activity change, appetite change and fatigue.   HENT: Negative for congestion and facial swelling.    Eyes: Negative for photophobia, redness and visual disturbance.   Respiratory: Negative for cough and chest tightness.    Cardiovascular: Negative for chest pain, palpitations and leg swelling.   Gastrointestinal: Negative for nausea, diarrhea, constipation, blood in stool and abdominal distention.   Genitourinary: Negative.    Musculoskeletal: Negative.reports no pain at rest    Skin: Negative.    Neurological: Negative for dizziness, tremors, syncope, weakness, light-headedness and headaches.   Hematological: Does not bruise/bleed easily.   Psychiatric/Behavioral: Negative.  Recall is impaired      Physical Exam   /75   Pulse 79   Temp 96.9  F (36.1  C)   Resp 20   Ht 1.651 m (5' 5\")   Wt 56.2 kg (124 lb)   SpO2 97%   BMI 20.63 kg/m       Constitutional: Oriented to person, and appears well-developed.   HEENT:  Normocephalic and atraumatic.  Eyes: Conjunctivae and EOM are normal. Pupils are equal, round, and reactive to light. No discharge.  No scleral icterus. Nose normal. Mouth/Throat: Oropharynx is clear and moist. No oropharyngeal exudate.    NECK: Normal range of motion. Neck supple. No JVD present. No tracheal deviation present. No thyromegaly present.   CARDIOVASCULAR: Normal rate, regular rhythm and intact distal pulses.  Exam reveals no gallop and no friction rub.  Systolic murmur present.  PULMONARY: Effort normal and breath sounds normal. No respiratory distress.No Wheezing or rales.  ABDOMEN: Soft. Bowel sounds are " normal. No distension and no mass.  There is no tenderness. There is no rebound and no guarding. No HSM.  MUSCULOSKELETAL: Normal range of motion. No edema and no tenderness. Mild kyphosis, no tenderness.  LYMPH NODES: Has no cervical, supraclavicular, axillary and groin adenopathy.   NEUROLOGICAL: Alert and oriented to person, . No cranial nerve deficit.  Normal muscle tone. Coordination normal.   GENITOURINARY: Deferred exam.  SKIN: Skin is warm and dry. No rash noted. No erythema. No pallor.   EXTREMITIES: No cyanosis, no clubbing, no edema. No Deformity.  PSYCHIATRIC: Normal mood, affect and behavior.  Recall is impaired    Lab Results     Cbc nl  Bmp-na 134  Troponin nl  Check BMP was normal Keppra level was 9    Electronically signed by  Eusebia Welch MD

## 2022-05-03 ENCOUNTER — LAB REQUISITION (OUTPATIENT)
Dept: LAB | Facility: CLINIC | Age: 87
End: 2022-05-03
Payer: COMMERCIAL

## 2022-05-03 DIAGNOSIS — U07.1 COVID-19: ICD-10-CM

## 2022-05-03 PROCEDURE — U0003 INFECTIOUS AGENT DETECTION BY NUCLEIC ACID (DNA OR RNA); SEVERE ACUTE RESPIRATORY SYNDROME CORONAVIRUS 2 (SARS-COV-2) (CORONAVIRUS DISEASE [COVID-19]), AMPLIFIED PROBE TECHNIQUE, MAKING USE OF HIGH THROUGHPUT TECHNOLOGIES AS DESCRIBED BY CMS-2020-01-R: HCPCS | Mod: ORL | Performed by: FAMILY MEDICINE

## 2022-05-04 ENCOUNTER — LAB REQUISITION (OUTPATIENT)
Dept: LAB | Facility: CLINIC | Age: 87
End: 2022-05-04
Payer: COMMERCIAL

## 2022-05-04 DIAGNOSIS — R32 UNSPECIFIED URINARY INCONTINENCE: ICD-10-CM

## 2022-05-04 LAB
ALBUMIN UR-MCNC: NEGATIVE MG/DL
APPEARANCE UR: CLEAR
BILIRUB UR QL STRIP: NEGATIVE
COLOR UR AUTO: COLORLESS
GLUCOSE UR STRIP-MCNC: NEGATIVE MG/DL
HGB UR QL STRIP: NEGATIVE
KETONES UR STRIP-MCNC: NEGATIVE MG/DL
LEUKOCYTE ESTERASE UR QL STRIP: NEGATIVE
NITRATE UR QL: NEGATIVE
PH UR STRIP: 7 [PH] (ref 5–7)
RBC URINE: 1 /HPF
SARS-COV-2 RNA RESP QL NAA+PROBE: NEGATIVE
SP GR UR STRIP: 1.01 (ref 1–1.03)
SQUAMOUS EPITHELIAL: <1 /HPF
UROBILINOGEN UR STRIP-MCNC: <2 MG/DL
WBC URINE: 0 /HPF

## 2022-05-04 PROCEDURE — 87086 URINE CULTURE/COLONY COUNT: CPT | Mod: ORL | Performed by: FAMILY MEDICINE

## 2022-05-04 PROCEDURE — U0005 INFEC AGEN DETEC AMPLI PROBE: HCPCS | Mod: ORL | Performed by: FAMILY MEDICINE

## 2022-05-04 PROCEDURE — 81001 URINALYSIS AUTO W/SCOPE: CPT | Mod: ORL | Performed by: FAMILY MEDICINE

## 2022-05-05 ENCOUNTER — LAB REQUISITION (OUTPATIENT)
Dept: LAB | Facility: CLINIC | Age: 87
End: 2022-05-05
Payer: COMMERCIAL

## 2022-05-05 DIAGNOSIS — U07.1 COVID-19: ICD-10-CM

## 2022-05-05 LAB
BACTERIA UR CULT: NORMAL
SARS-COV-2 RNA RESP QL NAA+PROBE: NEGATIVE

## 2022-05-05 PROCEDURE — U0003 INFECTIOUS AGENT DETECTION BY NUCLEIC ACID (DNA OR RNA); SEVERE ACUTE RESPIRATORY SYNDROME CORONAVIRUS 2 (SARS-COV-2) (CORONAVIRUS DISEASE [COVID-19]), AMPLIFIED PROBE TECHNIQUE, MAKING USE OF HIGH THROUGHPUT TECHNOLOGIES AS DESCRIBED BY CMS-2020-01-R: HCPCS | Mod: ORL | Performed by: NURSE PRACTITIONER

## 2022-05-06 LAB — SARS-COV-2 RNA RESP QL NAA+PROBE: NEGATIVE

## 2022-05-10 VITALS
BODY MASS INDEX: 21.16 KG/M2 | RESPIRATION RATE: 20 BRPM | SYSTOLIC BLOOD PRESSURE: 130 MMHG | WEIGHT: 127 LBS | HEART RATE: 79 BPM | OXYGEN SATURATION: 97 % | HEIGHT: 65 IN | TEMPERATURE: 97.4 F | DIASTOLIC BLOOD PRESSURE: 75 MMHG

## 2022-05-10 RX ORDER — LORATADINE 10 MG/1
10 TABLET ORAL DAILY
COMMUNITY
Start: 2022-05-04 | End: 2022-06-03

## 2022-05-11 ENCOUNTER — DISCHARGE SUMMARY NURSING HOME (OUTPATIENT)
Dept: GERIATRICS | Facility: CLINIC | Age: 87
End: 2022-05-11
Payer: COMMERCIAL

## 2022-05-11 DIAGNOSIS — M54.50 CHRONIC BILATERAL LOW BACK PAIN WITHOUT SCIATICA: ICD-10-CM

## 2022-05-11 DIAGNOSIS — G89.29 CHRONIC BILATERAL LOW BACK PAIN WITHOUT SCIATICA: ICD-10-CM

## 2022-05-11 DIAGNOSIS — G40.909 SEIZURE DISORDER (H): ICD-10-CM

## 2022-05-11 DIAGNOSIS — M62.81 MUSCLE WEAKNESS: ICD-10-CM

## 2022-05-11 DIAGNOSIS — I10 PRIMARY HYPERTENSION: ICD-10-CM

## 2022-05-11 DIAGNOSIS — G31.9 NEURODEGENERATIVE DISORDER (H): Primary | ICD-10-CM

## 2022-05-11 DIAGNOSIS — E87.1 HYPONATREMIA: ICD-10-CM

## 2022-05-11 PROCEDURE — 99316 NF DSCHRG MGMT 30 MIN+: CPT | Performed by: NURSE PRACTITIONER

## 2022-05-11 NOTE — LETTER
5/10/2022        RE: Mariela Hagen  [address]  [facility] [place] [address]        Code Status:  FULL CODE  Visit Type: Discharge Summary Nursing Home     Facility:   [facility] ([facility]) [[id number]]  PCP:  Jeanmarie Cabral (Inactive)  None       Admission Date to our Facility: 4/15/2022  Discharge Date from our Facility: 5/12/2022    Discharge Diagnosis:    Neurodegenerative disorder (H)  Primary hypertension  Seizure disorder (H)  Muscle weakness  Hyponatremia  Chronic bilateral low back pain without sciatica        History of Present Illness: Mariela Hagen is a 88 year old female with a past medical history for HTN, HLD, CVA, atypical migraines, OAB with sacral stimulator, osteoporosis, IBS.  She was recently hospitalized for encephalopathy and weakness.  She was found to have multiple electrolyte imbalances, transminitis. Workup showed SLUMS was 7/30.  Stated that encephalopathy likely related to vascular dementia or alzheimer's dementia and would recommend further workup as an outpatient at the memory clinic.  EEG did show mild-moderate generalized slowing  And so she was started on Keppra BID by neurology.  LP showed elevated P-TAU/ABETA42 ratio which is likely due to neurodegenerative process.      Elevated LFTs likely due to overuse of apap and so this was held and LFTs improved.       Skilled Nursing Facility Course:  While at the TCU, she was able to improve strength and endurance and is ambulating with a walker with supervision.      Seizure disorder (H)  No seizure activity, continue on Keppra, follow up with neurodegenerative clinic next month.      Neurodegenerative disorder (H)  CPT 3.7 and SLUMS 11/30. Needs [facility] at discharge      Primary hypertension  Diet controlled only.      Muscle weakness  Continue with therapies, strength is improving.      CVA:  No asa, is on a statin.     Hyponatremia  Last Na was 138     Discharge Plan:  Stable to discharge to [facility].   "She will need to follow up with [facility] provider in the next 2 weeks.      Discharge Medications:   Current Outpatient Medications   Medication Sig Dispense Refill     acetaminophen (TYLENOL) 325 MG tablet Take 325 mg by mouth every 8 hours as needed for mild pain       ferrous sulfate (FEROSUL) 325 (65 Fe) MG tablet Take 325 mg by mouth daily (with breakfast)       glucosamine 500 MG CAPS capsule Take 1,500 mg by mouth daily       latanoprost (XALATAN) 0.005 % ophthalmic solution Place 1 drop into the right eye daily       levETIRAcetam (KEPPRA) 500 MG tablet Take 500 mg by mouth 2 times daily       loratadine (CLARITIN) 10 MG tablet Take 10 mg by mouth daily       simvastatin (ZOCOR) 20 MG tablet [SIMVASTATIN (ZOCOR) 20 MG TABLET] Take 20 mg by mouth every morning.          Review of Systems   Patient denies fever, chills, headache, lightheadedness, dizziness, rhinorrhea, cough, congestion, shortness of breath, chest pain, palpitations, abdominal pain, n/v, diarrhea, constipation, change in appetite, change in sleep pattern, dysuria, frequency, burning or pain with urination.  Other than stated in HPI all other review of systems is negative.         Physical Exam  Vital signs:/75   Pulse 79   Temp 97.4  F (36.3  C)   Resp 20   Ht 1.651 m (5' 5\")   Wt 57.6 kg (127 lb)   SpO2 97%   BMI 21.13 kg/m     GENERAL APPEARANCE: Well developed, well nourished, in no acute distress.  HEENT: normocephalic, atraumatic  sclerae anicteric, conjunctivae clear and moist, EOM intact  LUNGS: Lung sounds CTA, no adventitious sounds, respiratory effort normal.  CARD: RRR, S1, S2, without murmurs, gallops, rubs  ABD: Soft, nondistended and nontender with normal bowel sounds.   MSK: Muscle strength and tone were equal bilaterally. Moves all extremities easily and intentionally.   EXTREMITIES: No cyanosis, clubbing or edema.  NEURO: Alert with cognitive impairment,   Face is symmetric.  SKIN: Inspection of the skin reveals no " rashes, ulcerations or petechiae.  PSYCH: euthymic      Labs:    Last Comprehensive Metabolic Panel:  Sodium   Date Value Ref Range Status   04/25/2022 138 136 - 145 mmol/L Final     Potassium   Date Value Ref Range Status   04/25/2022 3.7 3.5 - 5.0 mmol/L Final     Chloride   Date Value Ref Range Status   04/25/2022 99 98 - 107 mmol/L Final     Carbon Dioxide (CO2)   Date Value Ref Range Status   04/25/2022 29 22 - 31 mmol/L Final     Anion Gap   Date Value Ref Range Status   04/25/2022 10 5 - 18 mmol/L Final     Glucose   Date Value Ref Range Status   04/25/2022 96 70 - 125 mg/dL Final     Urea Nitrogen   Date Value Ref Range Status   04/25/2022 13 8 - 28 mg/dL Final     Creatinine   Date Value Ref Range Status   04/25/2022 0.67 0.60 - 1.10 mg/dL Final     GFR Estimate   Date Value Ref Range Status   04/25/2022 84 >60 mL/min/1.73m2 Final     Comment:     Effective December 21, 2021 eGFRcr in adults is calculated using the 2021 CKD-EPI creatinine equation which includes age and gender (Slick et al., NEJM, DOI: 10.1056/NHJVtc8752010)   05/13/2021 >60 >60 mL/min/1.73m2 Final     Calcium   Date Value Ref Range Status   04/25/2022 9.8 8.5 - 10.5 mg/dL Final       MEDICAL EQUIPMENT NEEDS:  NA      DISCHARGE PLAN/FACE TO FACE:  I certify that services are/were furnished while this patient was under the care of a physician and that a physician or an allowed non-physician practitioner (NPP), had a face-to-face encounter that meets the physician face-to-face encounter requirements. The encounter was in whole, or in part, related to the primary reason for home health. The patient is confined to his/her home and needs intermittent skilled nursing, physical therapy, speech-language pathology, or the continued need for occupational therapy. A plan of care has been established by a physician and is periodically reviewed by a physician.    I certify that this patient is under my care and that I, or a nurse practitioner or  physician's assistant working with me, had a face-to-face encounter that meets the physician face-to-face encounter requirements with this patient.   Date of Face-to-Face Encounter: 5/11/2022    I certify that, based on my findings, the following services are medically necessary home health services: PT/OT    My clinical findings support the need for the above skilled services because: PT/OT for ongoing strengthening and endurance    This patient is homebound because:  She requires maximum effort in order to get out into the community on a regular basis.     The patient is, or has been, under my care and I have initiated the establishment of the plan of care. This patient will be followed by a physician who will periodically review the plan of care.    35 total minutes spent with 20 minutes spent with patient and nursing in coordinating the discharge plan.     Electronically signed by: Neeru Riley NP          Sincerely,        Neeru Riley NP

## 2022-05-11 NOTE — PROGRESS NOTES
Code Status:  FULL CODE  Visit Type: Discharge Summary Nursing Home     Facility:   [facility] [[id number]]  PCP:  Jeanmarie Cabral (Inactive)  None       Admission Date to our Facility: 4/15/2022  Discharge Date from our Facility: 5/12/2022    Discharge Diagnosis:    Neurodegenerative disorder (H)  Primary hypertension  Seizure disorder (H)  Muscle weakness  Hyponatremia  Chronic bilateral low back pain without sciatica        History of Present Illness: Mariela Hagen is a 88 year old female with a past medical history for HTN, HLD, CVA, atypical migraines, OAB with sacral stimulator, osteoporosis, IBS.  She was recently hospitalized for encephalopathy and weakness.  She was found to have multiple electrolyte imbalances, transminitis. Workup showed SLUMS was 7/30.  Stated that encephalopathy likely related to vascular dementia or alzheimer's dementia and would recommend further workup as an outpatient at the memory clinic.  EEG did show mild-moderate generalized slowing  And so she was started on Keppra BID by neurology.  LP showed elevated P-TAU/ABETA42 ratio which is likely due to neurodegenerative process.      Elevated LFTs likely due to overuse of apap and so this was held and LFTs improved.       Skilled Nursing Facility Course:  While at the [facility], she was able to improve strength and endurance and is ambulating with a walker with supervision.      Seizure disorder (H)  No seizure activity, continue on Keppra, follow up with neurodegenerative clinic next month.      Neurodegenerative disorder (H)  CPT 3.7 and SLUMS 11/30. Needs [facility] at discharge      Primary hypertension  Diet controlled only.      Muscle weakness  Continue with therapies, strength is improving.      CVA:  No asa, is on a statin.     Hyponatremia  Last Na was 138     Discharge Plan:  Stable to discharge to [facility].  She will need to follow up with [facility] provider in the next 2 weeks.      Discharge Medications:   Current  "Outpatient Medications   Medication Sig Dispense Refill     acetaminophen (TYLENOL) 325 MG tablet Take 325 mg by mouth every 8 hours as needed for mild pain       ferrous sulfate (FEROSUL) 325 (65 Fe) MG tablet Take 325 mg by mouth daily (with breakfast)       glucosamine 500 MG CAPS capsule Take 1,500 mg by mouth daily       latanoprost (XALATAN) 0.005 % ophthalmic solution Place 1 drop into the right eye daily       levETIRAcetam (KEPPRA) 500 MG tablet Take 500 mg by mouth 2 times daily       loratadine (CLARITIN) 10 MG tablet Take 10 mg by mouth daily       simvastatin (ZOCOR) 20 MG tablet [SIMVASTATIN (ZOCOR) 20 MG TABLET] Take 20 mg by mouth every morning.          Review of Systems   Patient denies fever, chills, headache, lightheadedness, dizziness, rhinorrhea, cough, congestion, shortness of breath, chest pain, palpitations, abdominal pain, n/v, diarrhea, constipation, change in appetite, change in sleep pattern, dysuria, frequency, burning or pain with urination.  Other than stated in HPI all other review of systems is negative.         Physical Exam  Vital signs:/75   Pulse 79   Temp 97.4  F (36.3  C)   Resp 20   Ht 1.651 m (5' 5\")   Wt 57.6 kg (127 lb)   SpO2 97%   BMI 21.13 kg/m     GENERAL APPEARANCE: Well developed, well nourished, in no acute distress.  HEENT: normocephalic, atraumatic  sclerae anicteric, conjunctivae clear and moist, EOM intact  LUNGS: Lung sounds CTA, no adventitious sounds, respiratory effort normal.  CARD: RRR, S1, S2, without murmurs, gallops, rubs  ABD: Soft, nondistended and nontender with normal bowel sounds.   MSK: Muscle strength and tone were equal bilaterally. Moves all extremities easily and intentionally.   EXTREMITIES: No cyanosis, clubbing or edema.  NEURO: Alert with cognitive impairment,   Face is symmetric.  SKIN: Inspection of the skin reveals no rashes, ulcerations or petechiae.  PSYCH: euthymic      Labs:    Last Comprehensive Metabolic " Panel:  Sodium   Date Value Ref Range Status   04/25/2022 138 136 - 145 mmol/L Final     Potassium   Date Value Ref Range Status   04/25/2022 3.7 3.5 - 5.0 mmol/L Final     Chloride   Date Value Ref Range Status   04/25/2022 99 98 - 107 mmol/L Final     Carbon Dioxide (CO2)   Date Value Ref Range Status   04/25/2022 29 22 - 31 mmol/L Final     Anion Gap   Date Value Ref Range Status   04/25/2022 10 5 - 18 mmol/L Final     Glucose   Date Value Ref Range Status   04/25/2022 96 70 - 125 mg/dL Final     Urea Nitrogen   Date Value Ref Range Status   04/25/2022 13 8 - 28 mg/dL Final     Creatinine   Date Value Ref Range Status   04/25/2022 0.67 0.60 - 1.10 mg/dL Final     GFR Estimate   Date Value Ref Range Status   04/25/2022 84 >60 mL/min/1.73m2 Final     Comment:     Effective December 21, 2021 eGFRcr in adults is calculated using the 2021 CKD-EPI creatinine equation which includes age and gender (Slick et al., NEJ, DOI: 10.1056/DIRUgz7168369)   05/13/2021 >60 >60 mL/min/1.73m2 Final     Calcium   Date Value Ref Range Status   04/25/2022 9.8 8.5 - 10.5 mg/dL Final       MEDICAL EQUIPMENT NEEDS:  NA      DISCHARGE PLAN/FACE TO FACE:  I certify that services are/were furnished while this patient was under the care of a physician and that a physician or an allowed non-physician practitioner (NPP), had a face-to-face encounter that meets the physician face-to-face encounter requirements. The encounter was in whole, or in part, related to the primary reason for home health. The patient is confined to his/her home and needs intermittent skilled nursing, physical therapy, speech-language pathology, or the continued need for occupational therapy. A plan of care has been established by a physician and is periodically reviewed by a physician.    I certify that this patient is under my care and that I, or a nurse practitioner or physician's assistant working with me, had a face-to-face encounter that meets the physician  face-to-face encounter requirements with this patient.   Date of Face-to-Face Encounter: 5/11/2022    I certify that, based on my findings, the following services are medically necessary home health services: PT/OT    My clinical findings support the need for the above skilled services because: PT/OT for ongoing strengthening and endurance    This patient is homebound because:  She requires maximum effort in order to get out into the community on a regular basis.     The patient is, or has been, under my care and I have initiated the establishment of the plan of care. This patient will be followed by a physician who will periodically review the plan of care.    35 total minutes spent with 20 minutes spent with patient and nursing in coordinating the discharge plan.     Electronically signed by: Neeru Riley NP

## 2022-05-29 ENCOUNTER — LAB REQUISITION (OUTPATIENT)
Dept: LAB | Facility: CLINIC | Age: 87
End: 2022-05-29
Payer: COMMERCIAL

## 2022-05-29 DIAGNOSIS — M81.0 AGE-RELATED OSTEOPOROSIS WITHOUT CURRENT PATHOLOGICAL FRACTURE: ICD-10-CM

## 2022-05-29 DIAGNOSIS — E87.1 HYPO-OSMOLALITY AND HYPONATREMIA: ICD-10-CM

## 2022-05-29 DIAGNOSIS — E78.5 HYPERLIPIDEMIA, UNSPECIFIED: ICD-10-CM

## 2022-05-31 LAB
ALBUMIN SERPL-MCNC: 3.4 G/DL (ref 3.5–5)
ALP SERPL-CCNC: 61 U/L (ref 45–120)
ALT SERPL W P-5'-P-CCNC: 18 U/L (ref 0–45)
ANION GAP SERPL CALCULATED.3IONS-SCNC: 10 MMOL/L (ref 5–18)
AST SERPL W P-5'-P-CCNC: 16 U/L (ref 0–40)
BILIRUB DIRECT SERPL-MCNC: 0.2 MG/DL
BILIRUB SERPL-MCNC: 0.5 MG/DL (ref 0–1)
BUN SERPL-MCNC: 19 MG/DL (ref 8–28)
CALCIUM SERPL-MCNC: 9.8 MG/DL (ref 8.5–10.5)
CHLORIDE BLD-SCNC: 101 MMOL/L (ref 98–107)
CO2 SERPL-SCNC: 30 MMOL/L (ref 22–31)
CREAT SERPL-MCNC: 0.78 MG/DL (ref 0.6–1.1)
ERYTHROCYTE [DISTWIDTH] IN BLOOD BY AUTOMATED COUNT: 13.4 % (ref 10–15)
GFR SERPL CREATININE-BSD FRML MDRD: 73 ML/MIN/1.73M2
GLUCOSE BLD-MCNC: 81 MG/DL (ref 70–125)
HCT VFR BLD AUTO: 43.7 % (ref 35–47)
HGB BLD-MCNC: 13.6 G/DL (ref 11.7–15.7)
MCH RBC QN AUTO: 31.5 PG (ref 26.5–33)
MCHC RBC AUTO-ENTMCNC: 31.1 G/DL (ref 31.5–36.5)
MCV RBC AUTO: 101 FL (ref 78–100)
PLATELET # BLD AUTO: 401 10E3/UL (ref 150–450)
POTASSIUM BLD-SCNC: 4 MMOL/L (ref 3.5–5)
PROT SERPL-MCNC: 6.8 G/DL (ref 6–8)
RBC # BLD AUTO: 4.32 10E6/UL (ref 3.8–5.2)
SODIUM SERPL-SCNC: 141 MMOL/L (ref 136–145)
WBC # BLD AUTO: 10.2 10E3/UL (ref 4–11)

## 2022-05-31 PROCEDURE — 36415 COLL VENOUS BLD VENIPUNCTURE: CPT | Mod: ORL | Performed by: NURSE PRACTITIONER

## 2022-05-31 PROCEDURE — 85027 COMPLETE CBC AUTOMATED: CPT | Mod: ORL | Performed by: NURSE PRACTITIONER

## 2022-05-31 PROCEDURE — P9604 ONE-WAY ALLOW PRORATED TRIP: HCPCS | Mod: ORL | Performed by: NURSE PRACTITIONER

## 2022-05-31 PROCEDURE — 82306 VITAMIN D 25 HYDROXY: CPT | Mod: ORL | Performed by: NURSE PRACTITIONER

## 2022-05-31 PROCEDURE — 82248 BILIRUBIN DIRECT: CPT | Mod: ORL | Performed by: NURSE PRACTITIONER

## 2022-05-31 PROCEDURE — 80053 COMPREHEN METABOLIC PANEL: CPT | Mod: ORL | Performed by: NURSE PRACTITIONER

## 2022-06-01 LAB — DEPRECATED CALCIDIOL+CALCIFEROL SERPL-MC: 40 UG/L (ref 20–75)

## 2022-07-23 ENCOUNTER — LAB REQUISITION (OUTPATIENT)
Dept: LAB | Facility: CLINIC | Age: 87
End: 2022-07-23
Payer: COMMERCIAL

## 2022-07-23 DIAGNOSIS — E87.1 HYPO-OSMOLALITY AND HYPONATREMIA: ICD-10-CM

## 2022-07-23 DIAGNOSIS — D64.9 ANEMIA, UNSPECIFIED: ICD-10-CM

## 2022-07-23 DIAGNOSIS — Z51.81 ENCOUNTER FOR THERAPEUTIC DRUG LEVEL MONITORING: ICD-10-CM

## 2022-07-26 LAB
ANION GAP SERPL CALCULATED.3IONS-SCNC: 10 MMOL/L (ref 7–15)
BUN SERPL-MCNC: 20.6 MG/DL (ref 8–23)
CALCIUM SERPL-MCNC: 9.8 MG/DL (ref 8.8–10.2)
CHLORIDE SERPL-SCNC: 101 MMOL/L (ref 98–107)
CREAT SERPL-MCNC: 0.76 MG/DL (ref 0.51–0.95)
DEPRECATED HCO3 PLAS-SCNC: 27 MMOL/L (ref 22–29)
ERYTHROCYTE [DISTWIDTH] IN BLOOD BY AUTOMATED COUNT: 12.8 % (ref 10–15)
GFR SERPL CREATININE-BSD FRML MDRD: 74 ML/MIN/1.73M2
GLUCOSE SERPL-MCNC: 101 MG/DL (ref 70–99)
HCT VFR BLD AUTO: 43.6 % (ref 35–47)
HGB BLD-MCNC: 14.3 G/DL (ref 11.7–15.7)
LEVETIRACETAM SERPL-MCNC: 33.3 ΜG/ML (ref 10–40)
MCH RBC QN AUTO: 31.6 PG (ref 26.5–33)
MCHC RBC AUTO-ENTMCNC: 32.8 G/DL (ref 31.5–36.5)
MCV RBC AUTO: 96 FL (ref 78–100)
PLATELET # BLD AUTO: 344 10E3/UL (ref 150–450)
POTASSIUM SERPL-SCNC: 4 MMOL/L (ref 3.4–5.3)
RBC # BLD AUTO: 4.53 10E6/UL (ref 3.8–5.2)
SODIUM SERPL-SCNC: 138 MMOL/L (ref 136–145)
WBC # BLD AUTO: 7.4 10E3/UL (ref 4–11)

## 2022-07-26 PROCEDURE — 80177 DRUG SCRN QUAN LEVETIRACETAM: CPT | Mod: ORL | Performed by: NURSE PRACTITIONER

## 2022-07-26 PROCEDURE — 80048 BASIC METABOLIC PNL TOTAL CA: CPT | Mod: ORL | Performed by: NURSE PRACTITIONER

## 2022-07-26 PROCEDURE — 85027 COMPLETE CBC AUTOMATED: CPT | Mod: ORL | Performed by: NURSE PRACTITIONER

## 2022-07-26 PROCEDURE — P9604 ONE-WAY ALLOW PRORATED TRIP: HCPCS | Mod: ORL | Performed by: NURSE PRACTITIONER

## 2022-07-26 PROCEDURE — 36415 COLL VENOUS BLD VENIPUNCTURE: CPT | Mod: ORL | Performed by: NURSE PRACTITIONER

## 2022-08-20 ENCOUNTER — LAB REQUISITION (OUTPATIENT)
Dept: LAB | Facility: CLINIC | Age: 87
End: 2022-08-20
Payer: COMMERCIAL

## 2022-08-20 DIAGNOSIS — D64.9 ANEMIA, UNSPECIFIED: ICD-10-CM

## 2022-08-23 LAB
ERYTHROCYTE [DISTWIDTH] IN BLOOD BY AUTOMATED COUNT: 13 % (ref 10–15)
HCT VFR BLD AUTO: 42 % (ref 35–47)
HGB BLD-MCNC: 13.8 G/DL (ref 11.7–15.7)
MCH RBC QN AUTO: 31.9 PG (ref 26.5–33)
MCHC RBC AUTO-ENTMCNC: 32.9 G/DL (ref 31.5–36.5)
MCV RBC AUTO: 97 FL (ref 78–100)
PLATELET # BLD AUTO: 395 10E3/UL (ref 150–450)
RBC # BLD AUTO: 4.32 10E6/UL (ref 3.8–5.2)
WBC # BLD AUTO: 6.9 10E3/UL (ref 4–11)

## 2022-08-23 PROCEDURE — 85027 COMPLETE CBC AUTOMATED: CPT | Mod: ORL | Performed by: NURSE PRACTITIONER

## 2022-08-23 PROCEDURE — 36415 COLL VENOUS BLD VENIPUNCTURE: CPT | Mod: ORL | Performed by: NURSE PRACTITIONER

## 2022-08-23 PROCEDURE — P9604 ONE-WAY ALLOW PRORATED TRIP: HCPCS | Mod: ORL | Performed by: NURSE PRACTITIONER

## 2023-01-30 ENCOUNTER — LAB REQUISITION (OUTPATIENT)
Dept: LAB | Facility: CLINIC | Age: 88
End: 2023-01-30
Payer: COMMERCIAL

## 2023-01-30 DIAGNOSIS — F03.90 UNSPECIFIED DEMENTIA, UNSPECIFIED SEVERITY, WITHOUT BEHAVIORAL DISTURBANCE, PSYCHOTIC DISTURBANCE, MOOD DISTURBANCE, AND ANXIETY (H): ICD-10-CM

## 2023-01-30 DIAGNOSIS — G40.001 LOCALIZATION-RELATED (FOCAL) (PARTIAL) IDIOPATHIC EPILEPSY AND EPILEPTIC SYNDROMES WITH SEIZURES OF LOCALIZED ONSET, NOT INTRACTABLE, WITH STATUS EPILEPTICUS (H): ICD-10-CM

## 2023-01-31 LAB
ANION GAP SERPL CALCULATED.3IONS-SCNC: 15 MMOL/L (ref 7–15)
BUN SERPL-MCNC: 22.7 MG/DL (ref 8–23)
CALCIUM SERPL-MCNC: 9.7 MG/DL (ref 8.8–10.2)
CHLORIDE SERPL-SCNC: 101 MMOL/L (ref 98–107)
CREAT SERPL-MCNC: 0.8 MG/DL (ref 0.51–0.95)
DEPRECATED HCO3 PLAS-SCNC: 23 MMOL/L (ref 22–29)
ERYTHROCYTE [DISTWIDTH] IN BLOOD BY AUTOMATED COUNT: 12.8 % (ref 10–15)
GFR SERPL CREATININE-BSD FRML MDRD: 70 ML/MIN/1.73M2
GLUCOSE SERPL-MCNC: 173 MG/DL (ref 70–99)
HCT VFR BLD AUTO: 42.3 % (ref 35–47)
HGB BLD-MCNC: 13.6 G/DL (ref 11.7–15.7)
LEVETIRACETAM SERPL-MCNC: 13.7 ΜG/ML (ref 10–40)
MCH RBC QN AUTO: 31.5 PG (ref 26.5–33)
MCHC RBC AUTO-ENTMCNC: 32.2 G/DL (ref 31.5–36.5)
MCV RBC AUTO: 98 FL (ref 78–100)
PLATELET # BLD AUTO: 326 10E3/UL (ref 150–450)
POTASSIUM SERPL-SCNC: 4.1 MMOL/L (ref 3.4–5.3)
RBC # BLD AUTO: 4.32 10E6/UL (ref 3.8–5.2)
SODIUM SERPL-SCNC: 139 MMOL/L (ref 136–145)
WBC # BLD AUTO: 7.5 10E3/UL (ref 4–11)

## 2023-01-31 PROCEDURE — 80048 BASIC METABOLIC PNL TOTAL CA: CPT | Mod: ORL | Performed by: NURSE PRACTITIONER

## 2023-01-31 PROCEDURE — 36415 COLL VENOUS BLD VENIPUNCTURE: CPT | Mod: ORL | Performed by: NURSE PRACTITIONER

## 2023-01-31 PROCEDURE — 85027 COMPLETE CBC AUTOMATED: CPT | Mod: ORL | Performed by: NURSE PRACTITIONER

## 2023-01-31 PROCEDURE — 80177 DRUG SCRN QUAN LEVETIRACETAM: CPT | Mod: ORL | Performed by: NURSE PRACTITIONER

## 2023-01-31 PROCEDURE — P9604 ONE-WAY ALLOW PRORATED TRIP: HCPCS | Mod: ORL | Performed by: NURSE PRACTITIONER

## 2023-06-17 ENCOUNTER — LAB REQUISITION (OUTPATIENT)
Dept: LAB | Facility: CLINIC | Age: 88
End: 2023-06-17
Payer: COMMERCIAL

## 2023-06-17 DIAGNOSIS — I09.0: ICD-10-CM

## 2023-06-20 LAB
ANION GAP SERPL CALCULATED.3IONS-SCNC: 15 MMOL/L (ref 7–15)
BUN SERPL-MCNC: 23.3 MG/DL (ref 8–23)
CALCIUM SERPL-MCNC: 9.6 MG/DL (ref 8.2–9.6)
CHLORIDE SERPL-SCNC: 103 MMOL/L (ref 98–107)
CREAT SERPL-MCNC: 0.72 MG/DL (ref 0.51–0.95)
DEPRECATED HCO3 PLAS-SCNC: 22 MMOL/L (ref 22–29)
ERYTHROCYTE [DISTWIDTH] IN BLOOD BY AUTOMATED COUNT: 13 % (ref 10–15)
GFR SERPL CREATININE-BSD FRML MDRD: 79 ML/MIN/1.73M2
GLUCOSE SERPL-MCNC: 65 MG/DL (ref 70–99)
HCT VFR BLD AUTO: 41.7 % (ref 35–47)
HGB BLD-MCNC: 13.4 G/DL (ref 11.7–15.7)
MCH RBC QN AUTO: 32.4 PG (ref 26.5–33)
MCHC RBC AUTO-ENTMCNC: 32.1 G/DL (ref 31.5–36.5)
MCV RBC AUTO: 101 FL (ref 78–100)
PLATELET # BLD AUTO: 296 10E3/UL (ref 150–450)
POTASSIUM SERPL-SCNC: 4.4 MMOL/L (ref 3.4–5.3)
RBC # BLD AUTO: 4.14 10E6/UL (ref 3.8–5.2)
SODIUM SERPL-SCNC: 140 MMOL/L (ref 136–145)
WBC # BLD AUTO: 7.7 10E3/UL (ref 4–11)

## 2023-06-20 PROCEDURE — 80048 BASIC METABOLIC PNL TOTAL CA: CPT | Mod: ORL | Performed by: NURSE PRACTITIONER

## 2023-06-20 PROCEDURE — P9604 ONE-WAY ALLOW PRORATED TRIP: HCPCS | Mod: ORL | Performed by: NURSE PRACTITIONER

## 2023-06-20 PROCEDURE — 36415 COLL VENOUS BLD VENIPUNCTURE: CPT | Mod: ORL | Performed by: NURSE PRACTITIONER

## 2023-06-20 PROCEDURE — 85027 COMPLETE CBC AUTOMATED: CPT | Mod: ORL | Performed by: NURSE PRACTITIONER

## 2023-11-26 ENCOUNTER — LAB REQUISITION (OUTPATIENT)
Dept: LAB | Facility: CLINIC | Age: 88
End: 2023-11-26
Payer: COMMERCIAL

## 2023-11-26 DIAGNOSIS — R53.83 OTHER FATIGUE: ICD-10-CM

## 2023-11-26 DIAGNOSIS — G40.909 EPILEPSY, UNSPECIFIED, NOT INTRACTABLE, WITHOUT STATUS EPILEPTICUS (H): ICD-10-CM

## 2023-11-28 LAB
ALBUMIN SERPL BCG-MCNC: 4.3 G/DL (ref 3.5–5.2)
ALP SERPL-CCNC: 64 U/L (ref 40–150)
ALT SERPL W P-5'-P-CCNC: 17 U/L (ref 0–50)
ANION GAP SERPL CALCULATED.3IONS-SCNC: 13 MMOL/L (ref 7–15)
AST SERPL W P-5'-P-CCNC: 24 U/L (ref 0–45)
BILIRUB DIRECT SERPL-MCNC: <0.2 MG/DL (ref 0–0.3)
BILIRUB SERPL-MCNC: 0.5 MG/DL
BUN SERPL-MCNC: 23.5 MG/DL (ref 8–23)
CALCIUM SERPL-MCNC: 9.9 MG/DL (ref 8.2–9.6)
CHLORIDE SERPL-SCNC: 102 MMOL/L (ref 98–107)
CREAT SERPL-MCNC: 0.8 MG/DL (ref 0.51–0.95)
DEPRECATED HCO3 PLAS-SCNC: 26 MMOL/L (ref 22–29)
EGFRCR SERPLBLD CKD-EPI 2021: 70 ML/MIN/1.73M2
ERYTHROCYTE [DISTWIDTH] IN BLOOD BY AUTOMATED COUNT: 13 % (ref 10–15)
GLUCOSE SERPL-MCNC: 135 MG/DL (ref 70–99)
HCT VFR BLD AUTO: 43.6 % (ref 35–47)
HGB BLD-MCNC: 13.9 G/DL (ref 11.7–15.7)
LEVETIRACETAM SERPL-MCNC: 10 ΜG/ML (ref 10–40)
MCH RBC QN AUTO: 32 PG (ref 26.5–33)
MCHC RBC AUTO-ENTMCNC: 31.9 G/DL (ref 31.5–36.5)
MCV RBC AUTO: 101 FL (ref 78–100)
PLATELET # BLD AUTO: 317 10E3/UL (ref 150–450)
POTASSIUM SERPL-SCNC: 4.2 MMOL/L (ref 3.4–5.3)
PROT SERPL-MCNC: 7.1 G/DL (ref 6.4–8.3)
RBC # BLD AUTO: 4.34 10E6/UL (ref 3.8–5.2)
SODIUM SERPL-SCNC: 141 MMOL/L (ref 135–145)
TSH SERPL DL<=0.005 MIU/L-ACNC: 2.81 UIU/ML (ref 0.3–4.2)
WBC # BLD AUTO: 6.8 10E3/UL (ref 4–11)

## 2023-11-28 PROCEDURE — P9604 ONE-WAY ALLOW PRORATED TRIP: HCPCS | Mod: ORL | Performed by: NURSE PRACTITIONER

## 2023-11-28 PROCEDURE — 84443 ASSAY THYROID STIM HORMONE: CPT | Mod: ORL | Performed by: NURSE PRACTITIONER

## 2023-11-28 PROCEDURE — 85027 COMPLETE CBC AUTOMATED: CPT | Mod: ORL | Performed by: NURSE PRACTITIONER

## 2023-11-28 PROCEDURE — 80053 COMPREHEN METABOLIC PANEL: CPT | Mod: ORL | Performed by: NURSE PRACTITIONER

## 2023-11-28 PROCEDURE — 80177 DRUG SCRN QUAN LEVETIRACETAM: CPT | Mod: ORL | Performed by: NURSE PRACTITIONER

## 2023-11-28 PROCEDURE — 82248 BILIRUBIN DIRECT: CPT | Mod: ORL | Performed by: NURSE PRACTITIONER

## 2023-11-28 PROCEDURE — 36415 COLL VENOUS BLD VENIPUNCTURE: CPT | Mod: ORL | Performed by: NURSE PRACTITIONER

## 2024-06-22 ENCOUNTER — LAB REQUISITION (OUTPATIENT)
Dept: LAB | Facility: CLINIC | Age: 89
End: 2024-06-22
Payer: COMMERCIAL

## 2024-06-22 DIAGNOSIS — F03.90 UNSPECIFIED DEMENTIA, UNSPECIFIED SEVERITY, WITHOUT BEHAVIORAL DISTURBANCE, PSYCHOTIC DISTURBANCE, MOOD DISTURBANCE, AND ANXIETY (H): ICD-10-CM

## 2024-06-25 LAB
ALBUMIN SERPL BCG-MCNC: 4.1 G/DL (ref 3.5–5.2)
ALP SERPL-CCNC: 59 U/L (ref 40–150)
ALT SERPL W P-5'-P-CCNC: 10 U/L (ref 0–50)
ANION GAP SERPL CALCULATED.3IONS-SCNC: 12 MMOL/L (ref 7–15)
AST SERPL W P-5'-P-CCNC: 18 U/L (ref 0–45)
BILIRUB SERPL-MCNC: 0.4 MG/DL
BUN SERPL-MCNC: 26.1 MG/DL (ref 8–23)
CALCIUM SERPL-MCNC: 9.7 MG/DL (ref 8.2–9.6)
CHLORIDE SERPL-SCNC: 103 MMOL/L (ref 98–107)
CREAT SERPL-MCNC: 0.81 MG/DL (ref 0.51–0.95)
DEPRECATED HCO3 PLAS-SCNC: 24 MMOL/L (ref 22–29)
EGFRCR SERPLBLD CKD-EPI 2021: 68 ML/MIN/1.73M2
ERYTHROCYTE [DISTWIDTH] IN BLOOD BY AUTOMATED COUNT: 13.1 % (ref 10–15)
GLUCOSE SERPL-MCNC: 111 MG/DL (ref 70–99)
HCT VFR BLD AUTO: 40.5 % (ref 35–47)
HGB BLD-MCNC: 13 G/DL (ref 11.7–15.7)
MCH RBC QN AUTO: 32 PG (ref 26.5–33)
MCHC RBC AUTO-ENTMCNC: 32.1 G/DL (ref 31.5–36.5)
MCV RBC AUTO: 100 FL (ref 78–100)
PLATELET # BLD AUTO: 315 10E3/UL (ref 150–450)
POTASSIUM SERPL-SCNC: 4.4 MMOL/L (ref 3.4–5.3)
PROT SERPL-MCNC: 6.8 G/DL (ref 6.4–8.3)
RBC # BLD AUTO: 4.06 10E6/UL (ref 3.8–5.2)
SODIUM SERPL-SCNC: 139 MMOL/L (ref 135–145)
TSH SERPL DL<=0.005 MIU/L-ACNC: 2.1 UIU/ML (ref 0.3–4.2)
WBC # BLD AUTO: 8.4 10E3/UL (ref 4–11)

## 2024-06-25 PROCEDURE — P9604 ONE-WAY ALLOW PRORATED TRIP: HCPCS | Mod: ORL | Performed by: NURSE PRACTITIONER

## 2024-06-25 PROCEDURE — 84443 ASSAY THYROID STIM HORMONE: CPT | Mod: ORL | Performed by: NURSE PRACTITIONER

## 2024-06-25 PROCEDURE — 85027 COMPLETE CBC AUTOMATED: CPT | Mod: ORL | Performed by: NURSE PRACTITIONER

## 2024-06-25 PROCEDURE — 36415 COLL VENOUS BLD VENIPUNCTURE: CPT | Mod: ORL | Performed by: NURSE PRACTITIONER

## 2024-06-25 PROCEDURE — 80053 COMPREHEN METABOLIC PANEL: CPT | Mod: ORL | Performed by: NURSE PRACTITIONER

## 2025-02-17 ENCOUNTER — LAB REQUISITION (OUTPATIENT)
Dept: LAB | Facility: CLINIC | Age: OVER 89
End: 2025-02-17
Payer: COMMERCIAL

## 2025-02-17 DIAGNOSIS — R56.9 UNSPECIFIED CONVULSIONS (H): ICD-10-CM

## 2025-02-17 DIAGNOSIS — G40.909 EPILEPSY, UNSPECIFIED, NOT INTRACTABLE, WITHOUT STATUS EPILEPTICUS (H): ICD-10-CM

## 2025-02-18 LAB
ALBUMIN SERPL BCG-MCNC: 4.1 G/DL (ref 3.5–5.2)
ALP SERPL-CCNC: 62 U/L (ref 40–150)
ALT SERPL W P-5'-P-CCNC: 14 U/L (ref 0–50)
ANION GAP SERPL CALCULATED.3IONS-SCNC: 14 MMOL/L (ref 7–15)
AST SERPL W P-5'-P-CCNC: 19 U/L (ref 0–45)
BILIRUB SERPL-MCNC: 0.4 MG/DL
BUN SERPL-MCNC: 32 MG/DL (ref 8–23)
CALCIUM SERPL-MCNC: 9.5 MG/DL (ref 8.8–10.4)
CHLORIDE SERPL-SCNC: 102 MMOL/L (ref 98–107)
CREAT SERPL-MCNC: 0.81 MG/DL (ref 0.51–0.95)
EGFRCR SERPLBLD CKD-EPI 2021: 68 ML/MIN/1.73M2
ERYTHROCYTE [DISTWIDTH] IN BLOOD BY AUTOMATED COUNT: 13 % (ref 10–15)
GLUCOSE SERPL-MCNC: 102 MG/DL (ref 70–99)
HCO3 SERPL-SCNC: 25 MMOL/L (ref 22–29)
HCT VFR BLD AUTO: 41.8 % (ref 35–47)
HGB BLD-MCNC: 13.5 G/DL (ref 11.7–15.7)
LEVETIRACETAM SERPL-MCNC: 17.9 ÂΜG/ML (ref 10–40)
MCH RBC QN AUTO: 32.2 PG (ref 26.5–33)
MCHC RBC AUTO-ENTMCNC: 32.3 G/DL (ref 31.5–36.5)
MCV RBC AUTO: 100 FL (ref 78–100)
PLATELET # BLD AUTO: 320 10E3/UL (ref 150–450)
POTASSIUM SERPL-SCNC: 4 MMOL/L (ref 3.4–5.3)
PROT SERPL-MCNC: 6.6 G/DL (ref 6.4–8.3)
RBC # BLD AUTO: 4.19 10E6/UL (ref 3.8–5.2)
SODIUM SERPL-SCNC: 141 MMOL/L (ref 135–145)
WBC # BLD AUTO: 9.2 10E3/UL (ref 4–11)

## 2025-08-05 ENCOUNTER — LAB REQUISITION (OUTPATIENT)
Dept: LAB | Facility: CLINIC | Age: OVER 89
End: 2025-08-05
Payer: COMMERCIAL

## 2025-08-05 DIAGNOSIS — R32 UNSPECIFIED URINARY INCONTINENCE: ICD-10-CM

## 2025-08-05 LAB
ALBUMIN UR-MCNC: NEGATIVE MG/DL
APPEARANCE UR: CLEAR
BACTERIA #/AREA URNS HPF: ABNORMAL /HPF
BILIRUB UR QL STRIP: NEGATIVE
COLOR UR AUTO: ABNORMAL
GLUCOSE UR STRIP-MCNC: NEGATIVE MG/DL
HGB UR QL STRIP: ABNORMAL
KETONES UR STRIP-MCNC: NEGATIVE MG/DL
LEUKOCYTE ESTERASE UR QL STRIP: NEGATIVE
MUCOUS THREADS #/AREA URNS LPF: PRESENT /LPF
NITRATE UR QL: NEGATIVE
PH UR STRIP: 5.5 [PH] (ref 5–7)
RBC URINE: 1 /HPF
SP GR UR STRIP: 1.01 (ref 1–1.03)
UROBILINOGEN UR STRIP-MCNC: NORMAL MG/DL
WBC URINE: 1 /HPF

## 2025-08-06 ENCOUNTER — LAB REQUISITION (OUTPATIENT)
Dept: LAB | Facility: CLINIC | Age: OVER 89
End: 2025-08-06
Payer: COMMERCIAL

## 2025-08-06 DIAGNOSIS — R53.83 OTHER FATIGUE: ICD-10-CM

## 2025-08-06 DIAGNOSIS — R63.4 ABNORMAL WEIGHT LOSS: ICD-10-CM

## 2025-08-06 DIAGNOSIS — G40.909 EPILEPSY, UNSPECIFIED, NOT INTRACTABLE, WITHOUT STATUS EPILEPTICUS (H): ICD-10-CM

## 2025-08-12 LAB
ALBUMIN SERPL BCG-MCNC: 4.4 G/DL (ref 3.5–5.2)
ALP SERPL-CCNC: 71 U/L (ref 40–150)
ALT SERPL W P-5'-P-CCNC: 18 U/L (ref 0–50)
ANION GAP SERPL CALCULATED.3IONS-SCNC: 12 MMOL/L (ref 7–15)
AST SERPL W P-5'-P-CCNC: 23 U/L (ref 0–45)
BILIRUB SERPL-MCNC: 0.4 MG/DL
BUN SERPL-MCNC: 33.2 MG/DL (ref 8–23)
CALCIUM SERPL-MCNC: 10.3 MG/DL (ref 8.8–10.4)
CHLORIDE SERPL-SCNC: 102 MMOL/L (ref 98–107)
CREAT SERPL-MCNC: 0.87 MG/DL (ref 0.51–0.95)
EGFRCR SERPLBLD CKD-EPI 2021: 62 ML/MIN/1.73M2
ERYTHROCYTE [DISTWIDTH] IN BLOOD BY AUTOMATED COUNT: 13.1 % (ref 10–15)
GLUCOSE SERPL-MCNC: 108 MG/DL (ref 70–99)
HCO3 SERPL-SCNC: 25 MMOL/L (ref 22–29)
HCT VFR BLD AUTO: 43.3 % (ref 35–47)
HGB BLD-MCNC: 13.7 G/DL (ref 11.7–15.7)
LEVETIRACETAM SERPL-MCNC: 27.5 ÂΜG/ML (ref 10–40)
MCH RBC QN AUTO: 32.1 PG (ref 26.5–33)
MCHC RBC AUTO-ENTMCNC: 31.6 G/DL (ref 31.5–36.5)
MCV RBC AUTO: 101.4 FL (ref 78–100)
PLATELET # BLD AUTO: 369 10E3/UL (ref 150–450)
POTASSIUM SERPL-SCNC: 4.6 MMOL/L (ref 3.4–5.3)
PROT SERPL-MCNC: 7.1 G/DL (ref 6.4–8.3)
RBC # BLD AUTO: 4.27 10E6/UL (ref 3.8–5.2)
SODIUM SERPL-SCNC: 139 MMOL/L (ref 135–145)
TSH SERPL DL<=0.005 MIU/L-ACNC: 2.68 UIU/ML (ref 0.3–4.2)
WBC # BLD AUTO: 12.74 10E3/UL (ref 4–11)

## 2025-08-13 ENCOUNTER — LAB REQUISITION (OUTPATIENT)
Dept: LAB | Facility: CLINIC | Age: OVER 89
End: 2025-08-13
Payer: COMMERCIAL

## 2025-08-18 ENCOUNTER — LAB REQUISITION (OUTPATIENT)
Dept: LAB | Facility: CLINIC | Age: OVER 89
End: 2025-08-18
Payer: COMMERCIAL

## 2025-08-18 DIAGNOSIS — D72.829 ELEVATED WHITE BLOOD CELL COUNT, UNSPECIFIED: ICD-10-CM

## 2025-08-19 LAB
BASOPHILS # BLD AUTO: 0.07 10E3/UL (ref 0–0.2)
BASOPHILS NFR BLD AUTO: 0.8 %
EOSINOPHIL # BLD AUTO: 0.24 10E3/UL (ref 0–0.7)
EOSINOPHIL NFR BLD AUTO: 2.9 %
ERYTHROCYTE [DISTWIDTH] IN BLOOD BY AUTOMATED COUNT: 12.9 % (ref 10–15)
HCT VFR BLD AUTO: 37.8 % (ref 35–47)
HGB BLD-MCNC: 12.3 G/DL (ref 11.7–15.7)
IMM GRANULOCYTES # BLD: 0.03 10E3/UL
IMM GRANULOCYTES NFR BLD: 0.4 %
LYMPHOCYTES # BLD AUTO: 2.75 10E3/UL (ref 0.8–5.3)
LYMPHOCYTES NFR BLD AUTO: 33.1 %
MCH RBC QN AUTO: 32.5 PG (ref 26.5–33)
MCHC RBC AUTO-ENTMCNC: 32.5 G/DL (ref 31.5–36.5)
MCV RBC AUTO: 100 FL (ref 78–100)
MONOCYTES # BLD AUTO: 1.06 10E3/UL (ref 0–1.3)
MONOCYTES NFR BLD AUTO: 12.8 %
NEUTROPHILS # BLD AUTO: 4.15 10E3/UL (ref 1.6–8.3)
NEUTROPHILS NFR BLD AUTO: 50 %
NRBC # BLD AUTO: <0.03 10E3/UL
NRBC BLD AUTO-RTO: 0 /100
PLATELET # BLD AUTO: 339 10E3/UL (ref 150–450)
RBC # BLD AUTO: 3.78 10E6/UL (ref 3.8–5.2)
WBC # BLD AUTO: 8.3 10E3/UL (ref 4–11)